# Patient Record
Sex: MALE | Race: WHITE
[De-identification: names, ages, dates, MRNs, and addresses within clinical notes are randomized per-mention and may not be internally consistent; named-entity substitution may affect disease eponyms.]

---

## 2018-04-03 ENCOUNTER — HOSPITAL ENCOUNTER (OUTPATIENT)
Dept: HOSPITAL 95 - MHTC | Age: 83
LOS: 1 days | Discharge: HOME | End: 2018-04-04
Attending: INTERNAL MEDICINE
Payer: MEDICARE

## 2018-04-03 VITALS — WEIGHT: 238.1 LBS | BODY MASS INDEX: 36.09 KG/M2 | HEIGHT: 67.99 IN

## 2018-04-03 DIAGNOSIS — Z99.81: ICD-10-CM

## 2018-04-03 DIAGNOSIS — R06.02: ICD-10-CM

## 2018-04-03 DIAGNOSIS — R94.39: ICD-10-CM

## 2018-04-03 DIAGNOSIS — I25.10: Primary | ICD-10-CM

## 2018-04-03 DIAGNOSIS — Z79.02: ICD-10-CM

## 2018-04-03 DIAGNOSIS — G47.30: ICD-10-CM

## 2018-04-03 PROCEDURE — B218YZZ FLUOROSCOPY OF LEFT INTERNAL MAMMARY BYPASS GRAFT USING OTHER CONTRAST: ICD-10-PCS | Performed by: INTERNAL MEDICINE

## 2018-04-03 PROCEDURE — C1725 CATH, TRANSLUMIN NON-LASER: HCPCS

## 2018-04-03 PROCEDURE — B240ZZ3 ULTRASONOGRAPHY OF SINGLE CORONARY ARTERY, INTRAVASCULAR: ICD-10-PCS | Performed by: INTERNAL MEDICINE

## 2018-04-03 PROCEDURE — 027035Z DILATION OF CORONARY ARTERY, ONE ARTERY WITH TWO DRUG-ELUTING INTRALUMINAL DEVICES, PERCUTANEOUS APPROACH: ICD-10-PCS | Performed by: INTERNAL MEDICINE

## 2018-04-03 PROCEDURE — 4A023N7 MEASUREMENT OF CARDIAC SAMPLING AND PRESSURE, LEFT HEART, PERCUTANEOUS APPROACH: ICD-10-PCS | Performed by: INTERNAL MEDICINE

## 2018-04-03 PROCEDURE — C1769 GUIDE WIRE: HCPCS

## 2018-04-03 PROCEDURE — C1753 CATH, INTRAVAS ULTRASOUND: HCPCS

## 2018-04-03 PROCEDURE — C1760 CLOSURE DEV, VASC: HCPCS

## 2018-04-03 PROCEDURE — C1874 STENT, COATED/COV W/DEL SYS: HCPCS

## 2018-04-03 PROCEDURE — B213YZZ FLUOROSCOPY OF MULTIPLE CORONARY ARTERY BYPASS GRAFTS USING OTHER CONTRAST: ICD-10-PCS | Performed by: INTERNAL MEDICINE

## 2018-04-03 PROCEDURE — 02703ZZ DILATION OF CORONARY ARTERY, ONE ARTERY, PERCUTANEOUS APPROACH: ICD-10-PCS | Performed by: INTERNAL MEDICINE

## 2018-04-03 PROCEDURE — C9600 PERC DRUG-EL COR STENT SING: HCPCS

## 2018-04-03 PROCEDURE — 4A033BC MEASUREMENT OF ARTERIAL PRESSURE, CORONARY, PERCUTANEOUS APPROACH: ICD-10-PCS | Performed by: INTERNAL MEDICINE

## 2018-04-03 PROCEDURE — B211YZZ FLUOROSCOPY OF MULTIPLE CORONARY ARTERIES USING OTHER CONTRAST: ICD-10-PCS | Performed by: INTERNAL MEDICINE

## 2018-04-04 LAB
ANION GAP SERPL CALCULATED.4IONS-SCNC: 6 MMOL/L (ref 6–16)
BASOPHILS # BLD AUTO: 0.05 K/MM3 (ref 0–0.23)
BASOPHILS NFR BLD AUTO: 1 % (ref 0–2)
BUN SERPL-MCNC: 21 MG/DL (ref 8–24)
CALCIUM SERPL-MCNC: 8.3 MG/DL (ref 8.5–10.1)
CHLORIDE SERPL-SCNC: 107 MMOL/L (ref 98–108)
CO2 SERPL-SCNC: 32 MMOL/L (ref 21–32)
CREAT SERPL-MCNC: 1.57 MG/DL (ref 0.6–1.2)
DEPRECATED RDW RBC AUTO: 52.7 FL (ref 35.1–46.3)
EOSINOPHIL # BLD AUTO: 0.17 K/MM3 (ref 0–0.68)
EOSINOPHIL NFR BLD AUTO: 3 % (ref 0–6)
ERYTHROCYTE [DISTWIDTH] IN BLOOD BY AUTOMATED COUNT: 16 % (ref 11.7–14.2)
GLUCOSE SERPL-MCNC: 101 MG/DL (ref 70–99)
HCT VFR BLD AUTO: 34.9 % (ref 37–53)
HGB BLD-MCNC: 9.8 G/DL (ref 13.5–17.5)
IMM GRANULOCYTES # BLD AUTO: 0.01 K/MM3 (ref 0–0.1)
IMM GRANULOCYTES NFR BLD AUTO: 0 % (ref 0–1)
LYMPHOCYTES # BLD AUTO: 1.14 K/MM3 (ref 0.84–5.2)
LYMPHOCYTES NFR BLD AUTO: 18 % (ref 21–46)
MCHC RBC AUTO-ENTMCNC: 28.1 G/DL (ref 31.5–36.5)
MCV RBC AUTO: 89 FL (ref 80–100)
MONOCYTES # BLD AUTO: 0.66 K/MM3 (ref 0.16–1.47)
MONOCYTES NFR BLD AUTO: 10 % (ref 4–13)
NEUTROPHILS # BLD AUTO: 4.36 K/MM3 (ref 1.96–9.15)
NEUTROPHILS NFR BLD AUTO: 68 % (ref 41–73)
NRBC # BLD AUTO: 0 K/MM3 (ref 0–0.02)
NRBC BLD AUTO-RTO: 0 /100 WBC (ref 0–0.2)
PLATELET # BLD AUTO: 161 K/MM3 (ref 150–400)
POTASSIUM SERPL-SCNC: 4.3 MMOL/L (ref 3.5–5.5)
SODIUM SERPL-SCNC: 145 MMOL/L (ref 136–145)

## 2018-04-24 ENCOUNTER — HOSPITAL ENCOUNTER (INPATIENT)
Dept: HOSPITAL 95 - ER | Age: 83
LOS: 5 days | Discharge: HOME | DRG: 205 | End: 2018-04-29
Attending: HOSPITALIST | Admitting: HOSPITALIST
Payer: MEDICARE

## 2018-04-24 VITALS — BODY MASS INDEX: 31.05 KG/M2 | HEIGHT: 72.01 IN | WEIGHT: 229.28 LBS

## 2018-04-24 DIAGNOSIS — M40.209: ICD-10-CM

## 2018-04-24 DIAGNOSIS — G92: ICD-10-CM

## 2018-04-24 DIAGNOSIS — F32.9: ICD-10-CM

## 2018-04-24 DIAGNOSIS — J96.22: ICD-10-CM

## 2018-04-24 DIAGNOSIS — I13.0: ICD-10-CM

## 2018-04-24 DIAGNOSIS — J96.21: ICD-10-CM

## 2018-04-24 DIAGNOSIS — J98.11: ICD-10-CM

## 2018-04-24 DIAGNOSIS — I50.43: ICD-10-CM

## 2018-04-24 DIAGNOSIS — Z95.1: ICD-10-CM

## 2018-04-24 DIAGNOSIS — J44.1: ICD-10-CM

## 2018-04-24 DIAGNOSIS — N18.3: ICD-10-CM

## 2018-04-24 DIAGNOSIS — T50.1X5A: ICD-10-CM

## 2018-04-24 DIAGNOSIS — N17.9: ICD-10-CM

## 2018-04-24 DIAGNOSIS — I25.10: ICD-10-CM

## 2018-04-24 DIAGNOSIS — E87.3: ICD-10-CM

## 2018-04-24 DIAGNOSIS — D63.8: ICD-10-CM

## 2018-04-24 DIAGNOSIS — E66.2: Primary | ICD-10-CM

## 2018-04-24 LAB
ALBUMIN SERPL BCP-MCNC: 3.8 G/DL (ref 3.4–5)
ALBUMIN/GLOB SERPL: 1.1 {RATIO} (ref 0.8–1.8)
ALT SERPL W P-5'-P-CCNC: 24 U/L (ref 12–78)
AST SERPL W P-5'-P-CCNC: 22 U/L (ref 12–37)
BASOPHILS # BLD AUTO: 0.08 K/MM3 (ref 0–0.23)
BASOPHILS NFR BLD AUTO: 1 % (ref 0–2)
BILIRUB SERPL-MCNC: 0.7 MG/DL (ref 0.1–1)
BUN SERPL-MCNC: 26 MG/DL (ref 8–24)
CALCIUM SERPL-MCNC: 8.5 MG/DL (ref 8.5–10.1)
CHLORIDE SERPL-SCNC: 97 MMOL/L (ref 98–108)
CO2 SERPL-SCNC: >45 MMOL/L (ref 21–32)
CREAT SERPL-MCNC: 1.38 MG/DL (ref 0.6–1.2)
DEPRECATED RDW RBC AUTO: 54 FL (ref 35.1–46.3)
EOSINOPHIL # BLD AUTO: 0.13 K/MM3 (ref 0–0.68)
EOSINOPHIL NFR BLD AUTO: 1 % (ref 0–6)
ERYTHROCYTE [DISTWIDTH] IN BLOOD BY AUTOMATED COUNT: 16 % (ref 11.7–14.2)
GLOBULIN SER CALC-MCNC: 3.4 G/DL (ref 2.2–4)
GLUCOSE SERPL-MCNC: 110 MG/DL (ref 70–99)
HCT VFR BLD AUTO: 45.4 % (ref 37–53)
HGB BLD-MCNC: 12.4 G/DL (ref 13.5–17.5)
IMM GRANULOCYTES # BLD AUTO: 0.04 K/MM3 (ref 0–0.1)
IMM GRANULOCYTES NFR BLD AUTO: 0 % (ref 0–1)
LYMPHOCYTES # BLD AUTO: 1.65 K/MM3 (ref 0.84–5.2)
LYMPHOCYTES NFR BLD AUTO: 15 % (ref 21–46)
MAGNESIUM SERPL-MCNC: 3 MG/DL (ref 1.6–2.4)
MCHC RBC AUTO-ENTMCNC: 27.3 G/DL (ref 31.5–36.5)
MCV RBC AUTO: 93 FL (ref 80–100)
MONOCYTES # BLD AUTO: 1.35 K/MM3 (ref 0.16–1.47)
MONOCYTES NFR BLD AUTO: 12 % (ref 4–13)
NEUTROPHILS # BLD AUTO: 8.1 K/MM3 (ref 1.96–9.15)
NEUTROPHILS NFR BLD AUTO: 71 % (ref 41–73)
NRBC # BLD AUTO: 0 K/MM3 (ref 0–0.02)
NRBC BLD AUTO-RTO: 0 /100 WBC (ref 0–0.2)
PH BLDA: 7.22 [PH] (ref 7.35–7.45)
PH BLDA: 7.26 [PH] (ref 7.35–7.45)
PLATELET # BLD AUTO: 196 K/MM3 (ref 150–400)
POTASSIUM SERPL-SCNC: 3.5 MMOL/L (ref 3.5–5.5)
PROT SERPL-MCNC: 7.2 G/DL (ref 6.4–8.2)
PROT UR STRIP-MCNC: (no result) MG/DL
RBC #/AREA URNS HPF: (no result) /HPF (ref 0–2)
SODIUM SERPL-SCNC: 145 MMOL/L (ref 136–145)
SP GR SPEC: 1.02 (ref 1–1.02)
UROBILINOGEN UR STRIP-MCNC: (no result) MG/DL

## 2018-04-24 PROCEDURE — G0515 COGNITIVE SKILLS DEVELOPMENT: HCPCS

## 2018-04-24 PROCEDURE — G8978 MOBILITY CURRENT STATUS: HCPCS

## 2018-04-24 PROCEDURE — G8979 MOBILITY GOAL STATUS: HCPCS

## 2018-04-24 PROCEDURE — G8987 SELF CARE CURRENT STATUS: HCPCS

## 2018-04-24 PROCEDURE — G8988 SELF CARE GOAL STATUS: HCPCS

## 2018-04-25 LAB
ALBUMIN SERPL BCP-MCNC: 3.3 G/DL (ref 3.4–5)
ALBUMIN/GLOB SERPL: 1 {RATIO} (ref 0.8–1.8)
ALT SERPL W P-5'-P-CCNC: 22 U/L (ref 12–78)
ANION GAP SERPL CALCULATED.4IONS-SCNC: 7 MMOL/L (ref 6–16)
AST SERPL W P-5'-P-CCNC: 17 U/L (ref 12–37)
BASOPHILS # BLD AUTO: 0.02 K/MM3 (ref 0–0.23)
BASOPHILS NFR BLD AUTO: 0 % (ref 0–2)
BILIRUB SERPL-MCNC: 0.8 MG/DL (ref 0.1–1)
BUN SERPL-MCNC: 31 MG/DL (ref 8–24)
CALCIUM SERPL-MCNC: 8.7 MG/DL (ref 8.5–10.1)
CHLORIDE SERPL-SCNC: 95 MMOL/L (ref 98–108)
CO2 SERPL-SCNC: 42 MMOL/L (ref 21–32)
CREAT SERPL-MCNC: 1.44 MG/DL (ref 0.6–1.2)
DEPRECATED RDW RBC AUTO: 51.5 FL (ref 35.1–46.3)
EOSINOPHIL # BLD AUTO: 0.02 K/MM3 (ref 0–0.68)
EOSINOPHIL NFR BLD AUTO: 0 % (ref 0–6)
ERYTHROCYTE [DISTWIDTH] IN BLOOD BY AUTOMATED COUNT: 15.9 % (ref 11.7–14.2)
GLOBULIN SER CALC-MCNC: 3.3 G/DL (ref 2.2–4)
GLUCOSE SERPL-MCNC: 112 MG/DL (ref 70–99)
HCT VFR BLD AUTO: 41.9 % (ref 37–53)
HGB BLD-MCNC: 11.9 G/DL (ref 13.5–17.5)
IMM GRANULOCYTES # BLD AUTO: 0.03 K/MM3 (ref 0–0.1)
IMM GRANULOCYTES NFR BLD AUTO: 0 % (ref 0–1)
LYMPHOCYTES # BLD AUTO: 1.07 K/MM3 (ref 0.84–5.2)
LYMPHOCYTES NFR BLD AUTO: 11 % (ref 21–46)
MCHC RBC AUTO-ENTMCNC: 28.4 G/DL (ref 31.5–36.5)
MCV RBC AUTO: 89 FL (ref 80–100)
MONOCYTES # BLD AUTO: 0.25 K/MM3 (ref 0.16–1.47)
MONOCYTES NFR BLD AUTO: 3 % (ref 4–13)
NEUTROPHILS # BLD AUTO: 8.54 K/MM3 (ref 1.96–9.15)
NEUTROPHILS NFR BLD AUTO: 86 % (ref 41–73)
NRBC # BLD AUTO: 0 K/MM3 (ref 0–0.02)
NRBC BLD AUTO-RTO: 0 /100 WBC (ref 0–0.2)
PH BLDA: 7.56 [PH] (ref 7.35–7.45)
PLATELET # BLD AUTO: 133 K/MM3 (ref 150–400)
POTASSIUM SERPL-SCNC: 3.7 MMOL/L (ref 3.5–5.5)
PROT SERPL-MCNC: 6.6 G/DL (ref 6.4–8.2)
SODIUM SERPL-SCNC: 144 MMOL/L (ref 136–145)

## 2018-04-25 PROCEDURE — 5A09357 ASSISTANCE WITH RESPIRATORY VENTILATION, LESS THAN 24 CONSECUTIVE HOURS, CONTINUOUS POSITIVE AIRWAY PRESSURE: ICD-10-PCS | Performed by: HOSPITALIST

## 2018-04-26 LAB
ALBUMIN SERPL BCP-MCNC: 3 G/DL (ref 3.4–5)
ANION GAP SERPL CALCULATED.4IONS-SCNC: 6 MMOL/L (ref 6–16)
BASOPHILS # BLD AUTO: 0.01 K/MM3 (ref 0–0.23)
BASOPHILS NFR BLD AUTO: 0 % (ref 0–2)
BUN SERPL-MCNC: 55 MG/DL (ref 8–24)
CALCIUM SERPL-MCNC: 8.4 MG/DL (ref 8.5–10.1)
CHLORIDE SERPL-SCNC: 95 MMOL/L (ref 98–108)
CO2 SERPL-SCNC: 40 MMOL/L (ref 21–32)
CREAT SERPL-MCNC: 1.82 MG/DL (ref 0.6–1.2)
DEPRECATED RDW RBC AUTO: 51.2 FL (ref 35.1–46.3)
EOSINOPHIL # BLD AUTO: 0 K/MM3 (ref 0–0.68)
EOSINOPHIL NFR BLD AUTO: 0 % (ref 0–6)
ERYTHROCYTE [DISTWIDTH] IN BLOOD BY AUTOMATED COUNT: 16.6 % (ref 11.7–14.2)
GLUCOSE SERPL-MCNC: 133 MG/DL (ref 70–99)
HCT VFR BLD AUTO: 37.6 % (ref 37–53)
HGB BLD-MCNC: 10.7 G/DL (ref 13.5–17.5)
IMM GRANULOCYTES # BLD AUTO: 0.07 K/MM3 (ref 0–0.1)
IMM GRANULOCYTES NFR BLD AUTO: 0 % (ref 0–1)
LYMPHOCYTES # BLD AUTO: 1.12 K/MM3 (ref 0.84–5.2)
LYMPHOCYTES NFR BLD AUTO: 7 % (ref 21–46)
MCHC RBC AUTO-ENTMCNC: 28.5 G/DL (ref 31.5–36.5)
MCV RBC AUTO: 86 FL (ref 80–100)
MONOCYTES # BLD AUTO: 0.42 K/MM3 (ref 0.16–1.47)
MONOCYTES NFR BLD AUTO: 3 % (ref 4–13)
NEUTROPHILS # BLD AUTO: 14.24 K/MM3 (ref 1.96–9.15)
NEUTROPHILS NFR BLD AUTO: 90 % (ref 41–73)
NRBC # BLD AUTO: 0 K/MM3 (ref 0–0.02)
NRBC BLD AUTO-RTO: 0 /100 WBC (ref 0–0.2)
PH BLDA: 7.46 [PH] (ref 7.35–7.45)
PHOSPHATE SERPL-MCNC: 2.7 MG/DL (ref 2.5–4.9)
PLATELET # BLD AUTO: 166 K/MM3 (ref 150–400)
POTASSIUM SERPL-SCNC: 3.5 MMOL/L (ref 3.5–5.5)
SODIUM SERPL-SCNC: 141 MMOL/L (ref 136–145)

## 2018-04-27 LAB
ALBUMIN SERPL BCP-MCNC: 3.5 G/DL (ref 3.4–5)
ANION GAP SERPL CALCULATED.4IONS-SCNC: 4 MMOL/L (ref 6–16)
BASOPHILS # BLD AUTO: 0.01 K/MM3 (ref 0–0.23)
BASOPHILS NFR BLD AUTO: 0 % (ref 0–2)
BUN SERPL-MCNC: 65 MG/DL (ref 8–24)
CALCIUM SERPL-MCNC: 8.8 MG/DL (ref 8.5–10.1)
CHLORIDE SERPL-SCNC: 99 MMOL/L (ref 98–108)
CO2 SERPL-SCNC: 42 MMOL/L (ref 21–32)
CREAT SERPL-MCNC: 1.89 MG/DL (ref 0.6–1.2)
DEPRECATED RDW RBC AUTO: 54.4 FL (ref 35.1–46.3)
EOSINOPHIL # BLD AUTO: 0 K/MM3 (ref 0–0.68)
EOSINOPHIL NFR BLD AUTO: 0 % (ref 0–6)
ERYTHROCYTE [DISTWIDTH] IN BLOOD BY AUTOMATED COUNT: 16.9 % (ref 11.7–14.2)
GLUCOSE SERPL-MCNC: 127 MG/DL (ref 70–99)
HCT VFR BLD AUTO: 42.1 % (ref 37–53)
HGB BLD-MCNC: 12 G/DL (ref 13.5–17.5)
IMM GRANULOCYTES # BLD AUTO: 0.07 K/MM3 (ref 0–0.1)
IMM GRANULOCYTES NFR BLD AUTO: 1 % (ref 0–1)
LYMPHOCYTES # BLD AUTO: 0.91 K/MM3 (ref 0.84–5.2)
LYMPHOCYTES NFR BLD AUTO: 7 % (ref 21–46)
MCHC RBC AUTO-ENTMCNC: 28.5 G/DL (ref 31.5–36.5)
MCV RBC AUTO: 89 FL (ref 80–100)
MONOCYTES # BLD AUTO: 0.66 K/MM3 (ref 0.16–1.47)
MONOCYTES NFR BLD AUTO: 5 % (ref 4–13)
NEUTROPHILS # BLD AUTO: 11.72 K/MM3 (ref 1.96–9.15)
NEUTROPHILS NFR BLD AUTO: 88 % (ref 41–73)
NRBC # BLD AUTO: 0 K/MM3 (ref 0–0.02)
NRBC BLD AUTO-RTO: 0 /100 WBC (ref 0–0.2)
PH BLDA: 7.37 [PH] (ref 7.35–7.45)
PHOSPHATE SERPL-MCNC: 3.6 MG/DL (ref 2.5–4.9)
PLATELET # BLD AUTO: 175 K/MM3 (ref 150–400)
POTASSIUM SERPL-SCNC: 3.6 MMOL/L (ref 3.5–5.5)
SODIUM SERPL-SCNC: 145 MMOL/L (ref 136–145)

## 2018-04-28 LAB
ALBUMIN SERPL BCP-MCNC: 3.4 G/DL (ref 3.4–5)
ANION GAP SERPL CALCULATED.4IONS-SCNC: 4 MMOL/L (ref 6–16)
BASOPHILS # BLD AUTO: 0.01 K/MM3 (ref 0–0.23)
BASOPHILS NFR BLD AUTO: 0 % (ref 0–2)
BUN SERPL-MCNC: 66 MG/DL (ref 8–24)
CALCIUM SERPL-MCNC: 8.7 MG/DL (ref 8.5–10.1)
CHLORIDE SERPL-SCNC: 98 MMOL/L (ref 98–108)
CO2 SERPL-SCNC: 43 MMOL/L (ref 21–32)
CREAT SERPL-MCNC: 1.76 MG/DL (ref 0.6–1.2)
DEPRECATED RDW RBC AUTO: 56.5 FL (ref 35.1–46.3)
EOSINOPHIL # BLD AUTO: 0 K/MM3 (ref 0–0.68)
EOSINOPHIL NFR BLD AUTO: 0 % (ref 0–6)
ERYTHROCYTE [DISTWIDTH] IN BLOOD BY AUTOMATED COUNT: 16.9 % (ref 11.7–14.2)
GLUCOSE SERPL-MCNC: 104 MG/DL (ref 70–99)
HCT VFR BLD AUTO: 44.7 % (ref 37–53)
HGB BLD-MCNC: 12.1 G/DL (ref 13.5–17.5)
IMM GRANULOCYTES # BLD AUTO: 0.08 K/MM3 (ref 0–0.1)
IMM GRANULOCYTES NFR BLD AUTO: 1 % (ref 0–1)
LYMPHOCYTES # BLD AUTO: 0.78 K/MM3 (ref 0.84–5.2)
LYMPHOCYTES NFR BLD AUTO: 7 % (ref 21–46)
MCHC RBC AUTO-ENTMCNC: 27.1 G/DL (ref 31.5–36.5)
MCV RBC AUTO: 92 FL (ref 80–100)
MONOCYTES # BLD AUTO: 1.05 K/MM3 (ref 0.16–1.47)
MONOCYTES NFR BLD AUTO: 9 % (ref 4–13)
NEUTROPHILS # BLD AUTO: 9.43 K/MM3 (ref 1.96–9.15)
NEUTROPHILS NFR BLD AUTO: 83 % (ref 41–73)
NRBC # BLD AUTO: 0 K/MM3 (ref 0–0.02)
NRBC BLD AUTO-RTO: 0 /100 WBC (ref 0–0.2)
PH BLDA: 7.32 [PH] (ref 7.35–7.45)
PHOSPHATE SERPL-MCNC: 3.9 MG/DL (ref 2.5–4.9)
PLATELET # BLD AUTO: 177 K/MM3 (ref 150–400)
POTASSIUM SERPL-SCNC: 3.3 MMOL/L (ref 3.5–5.5)
SODIUM SERPL-SCNC: 145 MMOL/L (ref 136–145)

## 2018-04-29 LAB
ANION GAP SERPL CALCULATED.4IONS-SCNC: 2 MMOL/L (ref 6–16)
BUN SERPL-MCNC: 75 MG/DL (ref 8–24)
CALCIUM SERPL-MCNC: 8.5 MG/DL (ref 8.5–10.1)
CHLORIDE SERPL-SCNC: 97 MMOL/L (ref 98–108)
CO2 SERPL-SCNC: 45 MMOL/L (ref 21–32)
CREAT SERPL-MCNC: 1.95 MG/DL (ref 0.6–1.2)
GLUCOSE SERPL-MCNC: 122 MG/DL (ref 70–99)
PH BLDA: 7.34 [PH] (ref 7.35–7.45)
POTASSIUM SERPL-SCNC: 3.8 MMOL/L (ref 3.5–5.5)
SODIUM SERPL-SCNC: 144 MMOL/L (ref 136–145)

## 2018-05-02 ENCOUNTER — HOSPITAL ENCOUNTER (INPATIENT)
Dept: HOSPITAL 95 - ER | Age: 83
LOS: 3 days | Discharge: NURSING, REHAB OR LONG-TERM CARE | DRG: 189 | End: 2018-05-05
Attending: INTERNAL MEDICINE | Admitting: INTERNAL MEDICINE
Payer: MEDICARE

## 2018-05-02 VITALS — BODY MASS INDEX: 32.25 KG/M2 | HEIGHT: 72.01 IN | WEIGHT: 238.1 LBS

## 2018-05-02 DIAGNOSIS — J44.1: ICD-10-CM

## 2018-05-02 DIAGNOSIS — I25.10: ICD-10-CM

## 2018-05-02 DIAGNOSIS — Z95.1: ICD-10-CM

## 2018-05-02 DIAGNOSIS — F41.9: ICD-10-CM

## 2018-05-02 DIAGNOSIS — J96.22: ICD-10-CM

## 2018-05-02 DIAGNOSIS — E66.9: ICD-10-CM

## 2018-05-02 DIAGNOSIS — F03.90: ICD-10-CM

## 2018-05-02 DIAGNOSIS — J96.21: Primary | ICD-10-CM

## 2018-05-02 DIAGNOSIS — N18.3: ICD-10-CM

## 2018-05-02 DIAGNOSIS — Z79.899: ICD-10-CM

## 2018-05-02 DIAGNOSIS — G47.33: ICD-10-CM

## 2018-05-02 DIAGNOSIS — Z85.46: ICD-10-CM

## 2018-05-02 DIAGNOSIS — N17.9: ICD-10-CM

## 2018-05-02 DIAGNOSIS — Z95.5: ICD-10-CM

## 2018-05-02 DIAGNOSIS — G93.41: ICD-10-CM

## 2018-05-02 DIAGNOSIS — F32.9: ICD-10-CM

## 2018-05-02 DIAGNOSIS — I13.0: ICD-10-CM

## 2018-05-02 DIAGNOSIS — R53.1: ICD-10-CM

## 2018-05-02 DIAGNOSIS — Z99.81: ICD-10-CM

## 2018-05-02 DIAGNOSIS — J98.11: ICD-10-CM

## 2018-05-02 DIAGNOSIS — Z79.82: ICD-10-CM

## 2018-05-02 DIAGNOSIS — Z79.52: ICD-10-CM

## 2018-05-02 DIAGNOSIS — I50.42: ICD-10-CM

## 2018-05-02 DIAGNOSIS — Z79.02: ICD-10-CM

## 2018-05-02 DIAGNOSIS — Z87.891: ICD-10-CM

## 2018-05-02 DIAGNOSIS — I87.2: ICD-10-CM

## 2018-05-02 LAB
ALBUMIN SERPL BCP-MCNC: 3.2 G/DL (ref 3.4–5)
ALBUMIN/GLOB SERPL: 1 {RATIO} (ref 0.8–1.8)
ALT SERPL W P-5'-P-CCNC: 36 U/L (ref 12–78)
AST SERPL W P-5'-P-CCNC: 31 U/L (ref 12–37)
BASOPHILS # BLD AUTO: 0.02 K/MM3 (ref 0–0.23)
BASOPHILS NFR BLD AUTO: 0 % (ref 0–2)
BILIRUB SERPL-MCNC: 0.5 MG/DL (ref 0.1–1)
BUN SERPL-MCNC: 48 MG/DL (ref 8–24)
CALCIUM SERPL-MCNC: 8.6 MG/DL (ref 8.5–10.1)
CHLORIDE SERPL-SCNC: 99 MMOL/L (ref 98–108)
CO2 SERPL-SCNC: >45 MMOL/L (ref 21–32)
CREAT SERPL-MCNC: 1.48 MG/DL (ref 0.6–1.2)
DEPRECATED RDW RBC AUTO: 57 FL (ref 35.1–46.3)
EOSINOPHIL # BLD AUTO: 0.29 K/MM3 (ref 0–0.68)
EOSINOPHIL NFR BLD AUTO: 2 % (ref 0–6)
ERYTHROCYTE [DISTWIDTH] IN BLOOD BY AUTOMATED COUNT: 17 % (ref 11.7–14.2)
GLOBULIN SER CALC-MCNC: 3.1 G/DL (ref 2.2–4)
GLUCOSE SERPL-MCNC: 155 MG/DL (ref 70–99)
HCT VFR BLD AUTO: 44.3 % (ref 37–53)
HGB BLD-MCNC: 12 G/DL (ref 13.5–17.5)
IMM GRANULOCYTES # BLD AUTO: 0.11 K/MM3 (ref 0–0.1)
IMM GRANULOCYTES NFR BLD AUTO: 1 % (ref 0–1)
LYMPHOCYTES # BLD AUTO: 1.17 K/MM3 (ref 0.84–5.2)
LYMPHOCYTES NFR BLD AUTO: 9 % (ref 21–46)
MAGNESIUM SERPL-MCNC: 2.6 MG/DL (ref 1.6–2.4)
MCHC RBC AUTO-ENTMCNC: 27.1 G/DL (ref 31.5–36.5)
MCV RBC AUTO: 92 FL (ref 80–100)
MONOCYTES # BLD AUTO: 1.57 K/MM3 (ref 0.16–1.47)
MONOCYTES NFR BLD AUTO: 11 % (ref 4–13)
NEUTROPHILS # BLD AUTO: 10.62 K/MM3 (ref 1.96–9.15)
NEUTROPHILS NFR BLD AUTO: 77 % (ref 41–73)
NRBC # BLD AUTO: 0 K/MM3 (ref 0–0.02)
NRBC BLD AUTO-RTO: 0 /100 WBC (ref 0–0.2)
PH BLDA: 7.25 [PH] (ref 7.35–7.45)
PH BLDA: 7.27 [PH] (ref 7.35–7.45)
PLATELET # BLD AUTO: 153 K/MM3 (ref 150–400)
POTASSIUM SERPL-SCNC: 4.4 MMOL/L (ref 3.5–5.5)
PROT SERPL-MCNC: 6.3 G/DL (ref 6.4–8.2)
RBC #/AREA URNS HPF: (no result) /HPF (ref 0–2)
SODIUM SERPL-SCNC: 145 MMOL/L (ref 136–145)
SP GR SPEC: 1.02 (ref 1–1.02)
TROPONIN I SERPL-MCNC: <0.015 NG/ML (ref 0–0.04)
UROBILINOGEN UR STRIP-MCNC: (no result) MG/DL
WBC #/AREA URNS HPF: (no result) /HPF (ref 0–5)

## 2018-05-02 PROCEDURE — C9113 INJ PANTOPRAZOLE SODIUM, VIA: HCPCS

## 2018-05-02 PROCEDURE — G8979 MOBILITY GOAL STATUS: HCPCS

## 2018-05-02 PROCEDURE — G8978 MOBILITY CURRENT STATUS: HCPCS

## 2018-05-02 PROCEDURE — 5A09357 ASSISTANCE WITH RESPIRATORY VENTILATION, LESS THAN 24 CONSECUTIVE HOURS, CONTINUOUS POSITIVE AIRWAY PRESSURE: ICD-10-PCS | Performed by: INTERNAL MEDICINE

## 2018-05-03 LAB
ALBUMIN SERPL BCP-MCNC: 2.9 G/DL (ref 3.4–5)
ALBUMIN/GLOB SERPL: 1 {RATIO} (ref 0.8–1.8)
ALT SERPL W P-5'-P-CCNC: 33 U/L (ref 12–78)
ANION GAP SERPL CALCULATED.4IONS-SCNC: 5 MMOL/L (ref 6–16)
AST SERPL W P-5'-P-CCNC: 17 U/L (ref 12–37)
BASOPHILS # BLD AUTO: 0.01 K/MM3 (ref 0–0.23)
BASOPHILS NFR BLD AUTO: 0 % (ref 0–2)
BILIRUB SERPL-MCNC: 0.7 MG/DL (ref 0.1–1)
BUN SERPL-MCNC: 45 MG/DL (ref 8–24)
CALCIUM SERPL-MCNC: 8.5 MG/DL (ref 8.5–10.1)
CHLORIDE SERPL-SCNC: 101 MMOL/L (ref 98–108)
CO2 SERPL-SCNC: 40 MMOL/L (ref 21–32)
CREAT SERPL-MCNC: 1.17 MG/DL (ref 0.6–1.2)
DEPRECATED RDW RBC AUTO: 55.3 FL (ref 35.1–46.3)
EOSINOPHIL # BLD AUTO: 0 K/MM3 (ref 0–0.68)
EOSINOPHIL NFR BLD AUTO: 0 % (ref 0–6)
ERYTHROCYTE [DISTWIDTH] IN BLOOD BY AUTOMATED COUNT: 16.8 % (ref 11.7–14.2)
GLOBULIN SER CALC-MCNC: 2.8 G/DL (ref 2.2–4)
GLUCOSE SERPL-MCNC: 142 MG/DL (ref 70–99)
HCT VFR BLD AUTO: 41.1 % (ref 37–53)
HGB BLD-MCNC: 11.5 G/DL (ref 13.5–17.5)
IMM GRANULOCYTES # BLD AUTO: 0.11 K/MM3 (ref 0–0.1)
IMM GRANULOCYTES NFR BLD AUTO: 1 % (ref 0–1)
LYMPHOCYTES # BLD AUTO: 0.68 K/MM3 (ref 0.84–5.2)
LYMPHOCYTES NFR BLD AUTO: 6 % (ref 21–46)
MCHC RBC AUTO-ENTMCNC: 28 G/DL (ref 31.5–36.5)
MCV RBC AUTO: 90 FL (ref 80–100)
MONOCYTES # BLD AUTO: 0.05 K/MM3 (ref 0.16–1.47)
MONOCYTES NFR BLD AUTO: 0 % (ref 4–13)
NEUTROPHILS # BLD AUTO: 10.78 K/MM3 (ref 1.96–9.15)
NEUTROPHILS NFR BLD AUTO: 93 % (ref 41–73)
NRBC # BLD AUTO: 0 K/MM3 (ref 0–0.02)
NRBC BLD AUTO-RTO: 0 /100 WBC (ref 0–0.2)
PH BLDA: 7.45 [PH] (ref 7.35–7.45)
PLATELET # BLD AUTO: 150 K/MM3 (ref 150–400)
POTASSIUM SERPL-SCNC: 4.2 MMOL/L (ref 3.5–5.5)
PROT SERPL-MCNC: 5.7 G/DL (ref 6.4–8.2)
SODIUM SERPL-SCNC: 146 MMOL/L (ref 136–145)

## 2018-05-04 LAB
ALBUMIN SERPL BCP-MCNC: 2.8 G/DL (ref 3.4–5)
ALBUMIN/GLOB SERPL: 1 {RATIO} (ref 0.8–1.8)
ALT SERPL W P-5'-P-CCNC: 29 U/L (ref 12–78)
ANION GAP SERPL CALCULATED.4IONS-SCNC: 3 MMOL/L (ref 6–16)
AST SERPL W P-5'-P-CCNC: 16 U/L (ref 12–37)
BASOPHILS # BLD AUTO: 0.02 K/MM3 (ref 0–0.23)
BASOPHILS NFR BLD AUTO: 0 % (ref 0–2)
BILIRUB SERPL-MCNC: 0.7 MG/DL (ref 0.1–1)
BUN SERPL-MCNC: 58 MG/DL (ref 8–24)
CALCIUM SERPL-MCNC: 8.5 MG/DL (ref 8.5–10.1)
CHLORIDE SERPL-SCNC: 100 MMOL/L (ref 98–108)
CO2 SERPL-SCNC: 39 MMOL/L (ref 21–32)
CREAT SERPL-MCNC: 1.75 MG/DL (ref 0.6–1.2)
DEPRECATED RDW RBC AUTO: 54 FL (ref 35.1–46.3)
EOSINOPHIL # BLD AUTO: 0 K/MM3 (ref 0–0.68)
EOSINOPHIL NFR BLD AUTO: 0 % (ref 0–6)
ERYTHROCYTE [DISTWIDTH] IN BLOOD BY AUTOMATED COUNT: 17.6 % (ref 11.7–14.2)
GLOBULIN SER CALC-MCNC: 2.9 G/DL (ref 2.2–4)
GLUCOSE SERPL-MCNC: 112 MG/DL (ref 70–99)
HCT VFR BLD AUTO: 38.2 % (ref 37–53)
HGB BLD-MCNC: 11.3 G/DL (ref 13.5–17.5)
IMM GRANULOCYTES # BLD AUTO: 0.2 K/MM3 (ref 0–0.1)
IMM GRANULOCYTES NFR BLD AUTO: 2 % (ref 0–1)
LYMPHOCYTES # BLD AUTO: 1.22 K/MM3 (ref 0.84–5.2)
LYMPHOCYTES NFR BLD AUTO: 9 % (ref 21–46)
MCHC RBC AUTO-ENTMCNC: 29.6 G/DL (ref 31.5–36.5)
MCV RBC AUTO: 86 FL (ref 80–100)
MONOCYTES # BLD AUTO: 1.15 K/MM3 (ref 0.16–1.47)
MONOCYTES NFR BLD AUTO: 8 % (ref 4–13)
NEUTROPHILS # BLD AUTO: 11.13 K/MM3 (ref 1.96–9.15)
NEUTROPHILS NFR BLD AUTO: 81 % (ref 41–73)
NRBC # BLD AUTO: 0 K/MM3 (ref 0–0.02)
NRBC BLD AUTO-RTO: 0 /100 WBC (ref 0–0.2)
PH BLDA: 7.52 [PH] (ref 7.35–7.45)
PLATELET # BLD AUTO: 183 K/MM3 (ref 150–400)
POTASSIUM SERPL-SCNC: 3.7 MMOL/L (ref 3.5–5.5)
PROT SERPL-MCNC: 5.7 G/DL (ref 6.4–8.2)
SODIUM SERPL-SCNC: 142 MMOL/L (ref 136–145)

## 2018-05-05 LAB
ALBUMIN SERPL BCP-MCNC: 2.8 G/DL (ref 3.4–5)
ALBUMIN/GLOB SERPL: 0.9 {RATIO} (ref 0.8–1.8)
ALT SERPL W P-5'-P-CCNC: 30 U/L (ref 12–78)
ANION GAP SERPL CALCULATED.4IONS-SCNC: 2 MMOL/L (ref 6–16)
AST SERPL W P-5'-P-CCNC: 15 U/L (ref 12–37)
BILIRUB SERPL-MCNC: 0.5 MG/DL (ref 0.1–1)
BUN SERPL-MCNC: 62 MG/DL (ref 8–24)
CALCIUM SERPL-MCNC: 8.7 MG/DL (ref 8.5–10.1)
CHLORIDE SERPL-SCNC: 103 MMOL/L (ref 98–108)
CO2 SERPL-SCNC: 39 MMOL/L (ref 21–32)
CREAT SERPL-MCNC: 2.04 MG/DL (ref 0.6–1.2)
GLOBULIN SER CALC-MCNC: 3 G/DL (ref 2.2–4)
GLUCOSE SERPL-MCNC: 97 MG/DL (ref 70–99)
PH BLDA: 7.32 [PH] (ref 7.35–7.45)
PH BLDA: 7.34 [PH] (ref 7.35–7.45)
POTASSIUM SERPL-SCNC: 3.8 MMOL/L (ref 3.5–5.5)
PROT SERPL-MCNC: 5.8 G/DL (ref 6.4–8.2)
SODIUM SERPL-SCNC: 144 MMOL/L (ref 136–145)

## 2018-06-11 ENCOUNTER — HOSPITAL ENCOUNTER (OUTPATIENT)
Dept: HOSPITAL 95 - LAB | Age: 83
Discharge: HOME | End: 2018-06-11
Attending: FAMILY MEDICINE
Payer: MEDICARE

## 2018-06-11 DIAGNOSIS — N18.3: Primary | ICD-10-CM

## 2018-06-11 LAB
ALBUMIN SERPL BCP-MCNC: 3.7 G/DL (ref 3.4–5)
ANION GAP SERPL CALCULATED.4IONS-SCNC: 9 MMOL/L (ref 6–16)
BUN SERPL-MCNC: 70 MG/DL (ref 8–24)
CALCIUM SERPL-MCNC: 9.3 MG/DL (ref 8.5–10.1)
CHLORIDE SERPL-SCNC: 99 MMOL/L (ref 98–108)
CO2 SERPL-SCNC: 31 MMOL/L (ref 21–32)
CREAT SERPL-MCNC: 2.47 MG/DL (ref 0.6–1.2)
GLUCOSE SERPL-MCNC: 116 MG/DL (ref 70–99)
PHOSPHATE SERPL-MCNC: 3.5 MG/DL (ref 2.5–4.9)
POTASSIUM SERPL-SCNC: 3.9 MMOL/L (ref 3.5–5.5)
SODIUM SERPL-SCNC: 139 MMOL/L (ref 136–145)

## 2020-07-08 ENCOUNTER — HOSPITAL ENCOUNTER (OUTPATIENT)
Dept: HOSPITAL 95 - PLD | Age: 85
Discharge: HOME | End: 2020-07-08
Attending: DERMATOLOGY
Payer: MEDICARE

## 2020-07-08 DIAGNOSIS — D22.4: Primary | ICD-10-CM

## 2022-05-21 ENCOUNTER — HOSPITAL ENCOUNTER (EMERGENCY)
Dept: HOSPITAL 95 - ER | Age: 87
LOS: 1 days | Discharge: HOME | End: 2022-05-22
Payer: MEDICARE

## 2022-05-21 VITALS — WEIGHT: 259.99 LBS | BODY MASS INDEX: 35.21 KG/M2 | HEIGHT: 72 IN

## 2022-05-21 DIAGNOSIS — K21.9: ICD-10-CM

## 2022-05-21 DIAGNOSIS — M54.2: ICD-10-CM

## 2022-05-21 DIAGNOSIS — Z95.1: ICD-10-CM

## 2022-05-21 DIAGNOSIS — Z79.82: ICD-10-CM

## 2022-05-21 DIAGNOSIS — R07.89: ICD-10-CM

## 2022-05-21 DIAGNOSIS — Z99.81: ICD-10-CM

## 2022-05-21 DIAGNOSIS — I25.10: ICD-10-CM

## 2022-05-21 DIAGNOSIS — Z95.5: ICD-10-CM

## 2022-05-21 DIAGNOSIS — Z79.899: ICD-10-CM

## 2022-05-21 DIAGNOSIS — I50.42: ICD-10-CM

## 2022-05-21 DIAGNOSIS — M25.512: Primary | ICD-10-CM

## 2022-05-21 DIAGNOSIS — I11.0: ICD-10-CM

## 2022-05-21 DIAGNOSIS — J44.9: ICD-10-CM

## 2022-05-21 DIAGNOSIS — I25.2: ICD-10-CM

## 2022-05-21 LAB
ALBUMIN SERPL BCP-MCNC: 3.6 G/DL (ref 3.4–5)
ALBUMIN/GLOB SERPL: 1.1 {RATIO} (ref 0.8–1.8)
ALT SERPL W P-5'-P-CCNC: 24 U/L (ref 12–78)
ANION GAP SERPL CALCULATED.4IONS-SCNC: 5 MMOL/L (ref 6–16)
AST SERPL W P-5'-P-CCNC: 16 U/L (ref 12–37)
BASOPHILS # BLD AUTO: 0.08 K/MM3 (ref 0–0.23)
BASOPHILS NFR BLD AUTO: 1 % (ref 0–2)
BILIRUB SERPL-MCNC: 0.4 MG/DL (ref 0.1–1)
BUN SERPL-MCNC: 26 MG/DL (ref 8–24)
CALCIUM SERPL-MCNC: 8.9 MG/DL (ref 8.5–10.1)
CHLORIDE SERPL-SCNC: 107 MMOL/L (ref 98–108)
CO2 SERPL-SCNC: 33 MMOL/L (ref 21–32)
CREAT SERPL-MCNC: 2.12 MG/DL (ref 0.6–1.2)
DEPRECATED RDW RBC AUTO: 52.2 FL (ref 35.1–46.3)
EOSINOPHIL # BLD AUTO: 0.26 K/MM3 (ref 0–0.68)
EOSINOPHIL NFR BLD AUTO: 4 % (ref 0–6)
ERYTHROCYTE [DISTWIDTH] IN BLOOD BY AUTOMATED COUNT: 16.5 % (ref 11.7–14.2)
GLOBULIN SER CALC-MCNC: 3.3 G/DL (ref 2.2–4)
GLUCOSE SERPL-MCNC: 94 MG/DL (ref 70–99)
HCT VFR BLD AUTO: 41 % (ref 37–53)
HGB BLD-MCNC: 11.9 G/DL (ref 13.5–17.5)
IMM GRANULOCYTES # BLD AUTO: 0.02 K/MM3 (ref 0–0.1)
IMM GRANULOCYTES NFR BLD AUTO: 0 % (ref 0–1)
LYMPHOCYTES # BLD AUTO: 1.35 K/MM3 (ref 0.84–5.2)
LYMPHOCYTES NFR BLD AUTO: 20 % (ref 21–46)
MCHC RBC AUTO-ENTMCNC: 29 G/DL (ref 31.5–36.5)
MCV RBC AUTO: 87 FL (ref 80–100)
MONOCYTES # BLD AUTO: 0.74 K/MM3 (ref 0.16–1.47)
MONOCYTES NFR BLD AUTO: 11 % (ref 4–13)
NEUTROPHILS # BLD AUTO: 4.24 K/MM3 (ref 1.96–9.15)
NEUTROPHILS NFR BLD AUTO: 63 % (ref 41–73)
NRBC # BLD AUTO: 0 K/MM3 (ref 0–0.02)
NRBC BLD AUTO-RTO: 0 /100 WBC (ref 0–0.2)
PLATELET # BLD AUTO: 188 K/MM3 (ref 150–400)
POTASSIUM SERPL-SCNC: 4.1 MMOL/L (ref 3.5–5.5)
PROT SERPL-MCNC: 6.9 G/DL (ref 6.4–8.2)
SODIUM SERPL-SCNC: 145 MMOL/L (ref 136–145)

## 2022-05-21 PROCEDURE — A9270 NON-COVERED ITEM OR SERVICE: HCPCS

## 2023-03-03 ENCOUNTER — HOSPITAL ENCOUNTER (OUTPATIENT)
Dept: HOSPITAL 95 - MEDS | Age: 88
Setting detail: OBSERVATION
LOS: 1 days | Discharge: HOME | End: 2023-03-04
Attending: INTERNAL MEDICINE | Admitting: INTERNAL MEDICINE
Payer: MEDICARE

## 2023-03-03 VITALS — DIASTOLIC BLOOD PRESSURE: 84 MMHG | SYSTOLIC BLOOD PRESSURE: 142 MMHG

## 2023-03-03 VITALS — BODY MASS INDEX: 32.76 KG/M2 | HEIGHT: 72 IN | WEIGHT: 241.85 LBS

## 2023-03-03 DIAGNOSIS — F32.A: ICD-10-CM

## 2023-03-03 DIAGNOSIS — E78.5: ICD-10-CM

## 2023-03-03 DIAGNOSIS — I50.32: ICD-10-CM

## 2023-03-03 DIAGNOSIS — Z99.81: ICD-10-CM

## 2023-03-03 DIAGNOSIS — E66.01: ICD-10-CM

## 2023-03-03 DIAGNOSIS — J96.10: ICD-10-CM

## 2023-03-03 DIAGNOSIS — F41.9: ICD-10-CM

## 2023-03-03 DIAGNOSIS — I13.0: ICD-10-CM

## 2023-03-03 DIAGNOSIS — Z74.09: ICD-10-CM

## 2023-03-03 DIAGNOSIS — N18.32: ICD-10-CM

## 2023-03-03 DIAGNOSIS — Z79.82: ICD-10-CM

## 2023-03-03 DIAGNOSIS — G47.33: ICD-10-CM

## 2023-03-03 DIAGNOSIS — I25.10: ICD-10-CM

## 2023-03-03 DIAGNOSIS — E55.9: ICD-10-CM

## 2023-03-03 DIAGNOSIS — K21.9: ICD-10-CM

## 2023-03-03 DIAGNOSIS — K59.00: Primary | ICD-10-CM

## 2023-03-03 DIAGNOSIS — Z79.899: ICD-10-CM

## 2023-03-03 DIAGNOSIS — D63.1: ICD-10-CM

## 2023-03-03 PROCEDURE — G0378 HOSPITAL OBSERVATION PER HR: HCPCS

## 2023-03-03 PROCEDURE — A9270 NON-COVERED ITEM OR SERVICE: HCPCS

## 2023-03-03 NOTE — NUR
ADMISSION NOTE:
PATIENT IS A DIRECT ADMITT FROM [facility]. ARRIVED TO ROOM AT 1810 VIA
WHEELCHAIR. PATIENT ADMITTED c DX OF CONSTIPATION. ADMISSION SCREENING DONE.
ADMISSION AND SKIN ASSESSMENT c 2 RN VERIFIER COMPLETED. NOTED PATIENT HAS
BRUISING TO BUE'S SCATTERED T/O. DARK SKIN DISCOLORATION TO BLE'S. NO IV
ACCESS. PATIENT A&OX4. CALM, PLEASANT AND COOPERATIVE c CARE. PATIENT ON O2,
3L VIA NC c SPO2 OF 94%. DENIES CP/PRESSURE. LUNGS CLEAR/DIM T/O TO
AUSCULTATION. DENIES SOB, N/V. PATIENT REPORT HAD SMALL BROWN BM 3 HRS PRIOR
TO ADMISSION. VITAL SIGNS REVIEWED. PATIENT SITTING UP ON THE EOB AT THIS
TIME. CALLED DR. SCHULTZ AT AROUND 1815 BUT GOES TO VOICE MAIL. THIS RN
LEFT A MESSAGE TO CALL BACK REGARDING HIS DIRECT ADMITT ARRIVE TO ROOM. CALL
LIGHT IN REACH.

## 2023-03-04 VITALS — DIASTOLIC BLOOD PRESSURE: 83 MMHG | SYSTOLIC BLOOD PRESSURE: 168 MMHG

## 2023-03-04 VITALS — SYSTOLIC BLOOD PRESSURE: 147 MMHG | DIASTOLIC BLOOD PRESSURE: 76 MMHG

## 2023-03-04 VITALS — DIASTOLIC BLOOD PRESSURE: 94 MMHG | SYSTOLIC BLOOD PRESSURE: 167 MMHG

## 2023-03-04 LAB
ALBUMIN SERPL BCP-MCNC: 3.3 G/DL (ref 3.4–5)
ALBUMIN/GLOB SERPL: 0.9 {RATIO} (ref 0.8–1.8)
ALT SERPL W P-5'-P-CCNC: 17 U/L (ref 12–78)
ANION GAP SERPL CALCULATED.4IONS-SCNC: 4 MMOL/L (ref 6–16)
AST SERPL W P-5'-P-CCNC: 19 U/L (ref 12–37)
BASOPHILS # BLD AUTO: 0.09 K/MM3 (ref 0–0.23)
BASOPHILS NFR BLD AUTO: 1 % (ref 0–2)
BILIRUB SERPL-MCNC: 0.7 MG/DL (ref 0.1–1)
BUN SERPL-MCNC: 20 MG/DL (ref 8–24)
CALCIUM SERPL-MCNC: 9 MG/DL (ref 8.5–10.1)
CHLORIDE SERPL-SCNC: 107 MMOL/L (ref 98–108)
CO2 SERPL-SCNC: 32 MMOL/L (ref 21–32)
CREAT SERPL-MCNC: 1.94 MG/DL (ref 0.6–1.2)
DEPRECATED RDW RBC AUTO: 52.4 FL (ref 35.1–46.3)
EOSINOPHIL # BLD AUTO: 0.26 K/MM3 (ref 0–0.68)
EOSINOPHIL NFR BLD AUTO: 4 % (ref 0–6)
ERYTHROCYTE [DISTWIDTH] IN BLOOD BY AUTOMATED COUNT: 16.3 % (ref 11.7–14.2)
GLOBULIN SER CALC-MCNC: 3.5 G/DL (ref 2.2–4)
GLUCOSE SERPL-MCNC: 83 MG/DL (ref 70–99)
HCT VFR BLD AUTO: 41.1 % (ref 37–53)
HGB BLD-MCNC: 12.2 G/DL (ref 13.5–17.5)
IMM GRANULOCYTES # BLD AUTO: 0.02 K/MM3 (ref 0–0.1)
IMM GRANULOCYTES NFR BLD AUTO: 0 % (ref 0–1)
LYMPHOCYTES # BLD AUTO: 1.34 K/MM3 (ref 0.84–5.2)
LYMPHOCYTES NFR BLD AUTO: 21 % (ref 21–46)
MAGNESIUM SERPL-MCNC: 2.5 MG/DL (ref 1.6–2.4)
MCHC RBC AUTO-ENTMCNC: 29.7 G/DL (ref 31.5–36.5)
MCV RBC AUTO: 87 FL (ref 80–100)
MONOCYTES # BLD AUTO: 0.74 K/MM3 (ref 0.16–1.47)
MONOCYTES NFR BLD AUTO: 11 % (ref 4–13)
NEUTROPHILS # BLD AUTO: 4.02 K/MM3 (ref 1.96–9.15)
NEUTROPHILS NFR BLD AUTO: 62 % (ref 41–73)
NRBC # BLD AUTO: 0 K/MM3 (ref 0–0.02)
NRBC BLD AUTO-RTO: 0 /100 WBC (ref 0–0.2)
PLATELET # BLD AUTO: 195 K/MM3 (ref 150–400)
POTASSIUM SERPL-SCNC: 4.3 MMOL/L (ref 3.5–5.5)
PROT SERPL-MCNC: 6.8 G/DL (ref 6.4–8.2)
SODIUM SERPL-SCNC: 143 MMOL/L (ref 136–145)

## 2023-03-04 NOTE — NUR
PT DOWN TO CT, PT UNABLE TO GET CT DUE TO NOT BEING ABLE TO TOLERATE LAYING
FLAT FOR THE CT. PT STATES HE BECOMES SOB WHILE LAYING FLAT, EVEN WHILE HAVING
3L NC ON. NOTIFIED  OF PTS INABILITY TO TOLERATE CT SCAN. PER
 WE WILL R[place] IF PT NEEDS ABDOMINAL CT IN MORNING GIVEN THE
ORIGINAL ORDER FOR THE CT WAS FOR THE PTS CONSTIPATION AND AFTER THE PT
RECIEVED HIS FLEET ENEMA EARLIER THIS EVENING THE PT WAS ABLE TO HAVE A MEDIUM
SIZED BOWEL MOVEMENT.

## 2023-03-04 NOTE — NUR
SHIFT SUMMARY;
 
NO ACUTE CHANGES T/O THE NIGHT. THE PT RECIEVED A FLEET ENEMA AT THE
BEGINNING OF THE SHIFT, 1 MEDIUM SIZED FIRM BM SINCE THEN. THE PT HAS BEEN
RESTING IN BED FOR THE ENTIRETY OF THE NIGHT. THE PT HAS BEEN ROTATING BETWEEN
3L NC AND 3L BLEED INTO A CPAP. THE PTS O2 SATS HAVE BEEN >95% ON BOTH THE NC
AND THE CPAP. THE PT DENIES ANY PAIN OTHER THAN SOME PAIN IN HIS LOWER ABDOMEN
R/T CONSTIPATION. THE PT DENIES ANY CHEST PAIN/PRESSURE T/O THE NIGHT.
CURRENTLY THE PT IS RESTING IN BED WITH THE BED IN THE LOWEST POSITION AND THE
CALL LIGHT AT BEDSIDE.

## 2024-12-01 ENCOUNTER — HOSPITAL ENCOUNTER (OUTPATIENT)
Dept: HOSPITAL 95 - ER | Age: 89
Setting detail: OBSERVATION
LOS: 2 days | Discharge: HOME HEALTH | End: 2024-12-03
Attending: INTERNAL MEDICINE | Admitting: INTERNAL MEDICINE
Payer: MEDICARE

## 2024-12-01 VITALS — SYSTOLIC BLOOD PRESSURE: 113 MMHG | DIASTOLIC BLOOD PRESSURE: 96 MMHG

## 2024-12-01 VITALS — HEIGHT: 72 IN | BODY MASS INDEX: 32.51 KG/M2 | WEIGHT: 240 LBS

## 2024-12-01 VITALS — SYSTOLIC BLOOD PRESSURE: 119 MMHG | DIASTOLIC BLOOD PRESSURE: 58 MMHG

## 2024-12-01 VITALS — SYSTOLIC BLOOD PRESSURE: 122 MMHG | DIASTOLIC BLOOD PRESSURE: 63 MMHG

## 2024-12-01 DIAGNOSIS — F32.A: ICD-10-CM

## 2024-12-01 DIAGNOSIS — J44.1: ICD-10-CM

## 2024-12-01 DIAGNOSIS — K59.09: ICD-10-CM

## 2024-12-01 DIAGNOSIS — I50.32: ICD-10-CM

## 2024-12-01 DIAGNOSIS — Z79.82: ICD-10-CM

## 2024-12-01 DIAGNOSIS — E78.5: ICD-10-CM

## 2024-12-01 DIAGNOSIS — G47.33: ICD-10-CM

## 2024-12-01 DIAGNOSIS — J96.21: ICD-10-CM

## 2024-12-01 DIAGNOSIS — Z95.5: ICD-10-CM

## 2024-12-01 DIAGNOSIS — J96.22: Primary | ICD-10-CM

## 2024-12-01 DIAGNOSIS — N18.32: ICD-10-CM

## 2024-12-01 DIAGNOSIS — I13.0: ICD-10-CM

## 2024-12-01 DIAGNOSIS — Z87.891: ICD-10-CM

## 2024-12-01 DIAGNOSIS — Z79.899: ICD-10-CM

## 2024-12-01 DIAGNOSIS — I25.10: ICD-10-CM

## 2024-12-01 LAB
ALBUMIN SERPL BCP-MCNC: 3.2 G/DL (ref 3.4–5)
ALBUMIN/GLOB SERPL: 1 {RATIO} (ref 0.8–1.8)
ALT SERPL W P-5'-P-CCNC: 18 U/L (ref 12–78)
ANION GAP SERPL CALCULATED.4IONS-SCNC: 8 MMOL/L (ref 3–11)
AST SERPL W P-5'-P-CCNC: 25 U/L (ref 12–37)
BASOPHILS # BLD AUTO: 0.04 K/MM3 (ref 0–0.23)
BASOPHILS NFR BLD AUTO: 0 % (ref 0–2)
BILIRUB SERPL-MCNC: 0.6 MG/DL (ref 0.1–1)
BUN SERPL-MCNC: 26 MG/DL (ref 8–24)
CALCIUM SERPL-MCNC: 8.9 MG/DL (ref 8.5–10.1)
CHLORIDE SERPL-SCNC: 111 MMOL/L (ref 98–108)
CO2 SERPL-SCNC: 30 MMOL/L (ref 21–32)
CREAT SERPL-MCNC: 1.92 MG/DL (ref 0.6–1.2)
DEPRECATED RDW RBC AUTO: 54.1 FL (ref 35.1–46.3)
EOSINOPHIL # BLD AUTO: 0.07 K/MM3 (ref 0–0.68)
EOSINOPHIL NFR BLD AUTO: 1 % (ref 0–6)
ERYTHROCYTE [DISTWIDTH] IN BLOOD BY AUTOMATED COUNT: 16.1 % (ref 11.7–14.2)
FLUAV RNA SPEC QL NAA+PROBE: NEGATIVE
FLUBV RNA SPEC QL NAA+PROBE: NEGATIVE
GLOBULIN SER CALC-MCNC: 3.2 G/DL (ref 2.2–4)
GLUCOSE SERPL-MCNC: 100 MG/DL (ref 70–99)
HCT VFR BLD AUTO: 42.6 % (ref 37–53)
HGB BLD-MCNC: 12.6 G/DL (ref 13.5–17.5)
IMM GRANULOCYTES # BLD AUTO: 0.01 K/MM3 (ref 0–0.1)
IMM GRANULOCYTES NFR BLD AUTO: 0 % (ref 0–1)
LYMPHOCYTES # BLD AUTO: 0.46 K/MM3 (ref 0.84–5.2)
LYMPHOCYTES NFR BLD AUTO: 5 % (ref 21–46)
MCHC RBC AUTO-ENTMCNC: 29.6 G/DL (ref 31.5–36.5)
MCV RBC AUTO: 91 FL (ref 80–100)
MONOCYTES # BLD AUTO: 0.28 K/MM3 (ref 0.16–1.47)
MONOCYTES NFR BLD AUTO: 3 % (ref 4–13)
NEUTROPHILS # BLD AUTO: 8.22 K/MM3 (ref 1.96–9.15)
NEUTROPHILS NFR BLD AUTO: 91 % (ref 41–73)
NRBC # BLD AUTO: 0 K/MM3 (ref 0–0.02)
NRBC BLD AUTO-RTO: 0 /100 WBC (ref 0–0.2)
PH BLDV: 7.35 [PH] (ref 7.34–7.37)
PLATELET # BLD AUTO: 189 K/MM3 (ref 150–400)
POTASSIUM SERPL-SCNC: 4.1 MMOL/L (ref 3.5–5.5)
PROT SERPL-MCNC: 6.4 G/DL (ref 6.4–8.2)
RSV RNA SPEC QL NAA+PROBE: NEGATIVE
SARS-COV-2 RNA RESP QL NAA+PROBE: NEGATIVE
SODIUM SERPL-SCNC: 145 MMOL/L (ref 136–145)

## 2024-12-01 PROCEDURE — G0378 HOSPITAL OBSERVATION PER HR: HCPCS

## 2024-12-01 PROCEDURE — A9270 NON-COVERED ITEM OR SERVICE: HCPCS

## 2024-12-01 NOTE — NUR
SHIFT SUMMARY
PT A&OX4 AND ANSWERS QUESTIONS APPROPRIATELY. PT ARRIVED ON UNIT VIA W/C AT
AROUND 1130 AND IS ORIENTED TO THE ROOM AND UNIT. PT ON 4L O2 VIA N/C AND
SWITCHED TO A CPAP FOR NOC AND SLEEP. VSS, NO COMPLAINTS OF CP/PRESSURE OR
SOB. PT REPOSITIONED INDEPENDENTLY. PT SPENT MOST OF SHIFT SITTING UP IN
RECLINER. NO ACUTE EVENTS AT THIS TIME. FALL PRECAUTIONS IN PLACE AND CALL
LIGHT IN REACH. yes...

## 2024-12-01 NOTE — NUR
PT REMAINS IN HOME CLOTHES AT THIS TIME. RYANNE MARSHALL OFFERED TO BRING FRESH
CLOTHES DUE TO HOME CLOTHES BEING SOILED. PT DECLINED TO REMOVE HOME CLOTHES
AND USED A WIPE INSTEAD TO CLEAN OFF PANTS. DESPITE PT TEACHING, HE STILL
DECLINES AT THIS TIME TO CHANGE CLOTHES.

## 2024-12-02 VITALS — SYSTOLIC BLOOD PRESSURE: 162 MMHG | DIASTOLIC BLOOD PRESSURE: 68 MMHG

## 2024-12-02 VITALS — SYSTOLIC BLOOD PRESSURE: 130 MMHG | DIASTOLIC BLOOD PRESSURE: 58 MMHG

## 2024-12-02 VITALS — DIASTOLIC BLOOD PRESSURE: 82 MMHG | SYSTOLIC BLOOD PRESSURE: 168 MMHG

## 2024-12-02 VITALS — DIASTOLIC BLOOD PRESSURE: 62 MMHG | SYSTOLIC BLOOD PRESSURE: 142 MMHG

## 2024-12-02 LAB
ALBUMIN SERPL BCP-MCNC: 2.9 G/DL (ref 3.4–5)
ALBUMIN/GLOB SERPL: 0.8 {RATIO} (ref 0.8–1.8)
ALT SERPL W P-5'-P-CCNC: 17 U/L (ref 12–78)
ANION GAP SERPL CALCULATED.4IONS-SCNC: 8 MMOL/L (ref 3–11)
AST SERPL W P-5'-P-CCNC: 23 U/L (ref 12–37)
BASOPHILS # BLD: 0 K/MM3 (ref 0–0.23)
BASOPHILS NFR BLD: 0 % (ref 0–2)
BILIRUB SERPL-MCNC: 0.7 MG/DL (ref 0.1–1)
BUN SERPL-MCNC: 33 MG/DL (ref 8–24)
CALCIUM SERPL-MCNC: 8.7 MG/DL (ref 8.5–10.1)
CHLORIDE SERPL-SCNC: 106 MMOL/L (ref 98–108)
CO2 SERPL-SCNC: 32 MMOL/L (ref 21–32)
CREAT SERPL-MCNC: 2.4 MG/DL (ref 0.6–1.2)
DEPRECATED RDW RBC AUTO: 55.6 FL (ref 35.1–46.3)
EOSINOPHIL # BLD: 0.39 K/MM3 (ref 0–0.68)
EOSINOPHIL NFR BLD: 2 % (ref 0–6)
ERYTHROCYTE [DISTWIDTH] IN BLOOD BY AUTOMATED COUNT: 16.2 % (ref 11.7–14.2)
GLOBULIN SER CALC-MCNC: 3.5 G/DL (ref 2.2–4)
GLUCOSE SERPL-MCNC: 77 MG/DL (ref 70–99)
HCT VFR BLD AUTO: 39.9 % (ref 37–53)
HGB BLD-MCNC: 11.6 G/DL (ref 13.5–17.5)
LYMPHOCYTES # BLD: 2.77 K/MM3 (ref 0.84–5.2)
LYMPHOCYTES NFR BLD: 14 % (ref 21–46)
MAGNESIUM SERPL-MCNC: 2.8 MG/DL (ref 1.6–2.4)
MCHC RBC AUTO-ENTMCNC: 29.1 G/DL (ref 31.5–36.5)
MCV RBC AUTO: 92 FL (ref 80–100)
MONOCYTES # BLD: 1.18 K/MM3 (ref 0.16–1.47)
MONOCYTES NFR BLD: 6 % (ref 4–13)
NEUTS BAND NFR BLD MANUAL: 22 % (ref 0–8)
NEUTS SEG # BLD MANUAL: 15.45 K/MM3 (ref 1.96–9.15)
NEUTS SEG NFR BLD MANUAL: 56 % (ref 41–73)
NRBC # BLD AUTO: 0 K/MM3 (ref 0–0.02)
NRBC BLD AUTO-RTO: 0 /100 WBC (ref 0–0.2)
PLATELET # BLD AUTO: 177 K/MM3 (ref 150–400)
POTASSIUM SERPL-SCNC: 4.1 MMOL/L (ref 3.5–5.5)
PROT SERPL-MCNC: 6.4 G/DL (ref 6.4–8.2)
SODIUM SERPL-SCNC: 142 MMOL/L (ref 136–145)
TOTAL CELLS COUNTED BLD: 100

## 2024-12-02 NOTE — NUR
SHIFT SUMMARY
 
PT IS A&O X4, ABLE TO MAKE HIS NEEDS KNOWN AND COOPERATIVE WITH CARE. PT
SITTING AND RESTING T/O THE NIGHT HRS IN THE RECLINER. PT C/O FEELING COLD,
TEMP.97.1. HEATING PAD AND BLANKETS OFFERED. O2 @3L, CPAP AT NIGHT HRS. PT
DENIES SOB. PRN TYLENOL ADMINISTERED AT HS FOR RUQ PAIN. PT ABLE TO GET UP AND
USE FWW WITH SBA. NO ACUTE EVENTS DURING THIS SHIFT. CALL LIGHT WITHIN REACH.

## 2024-12-02 NOTE — NUR
SHIFT SUMMARY
PT A&OX4. PT ADMITTED DUE TO ACUTE RESPIRATORY FAILURE. PT REPORTS NO PAIN. PT
REPORTS SOB ON EXERTION. PT ON 3L OF O2 VIA N/C. PT REPORTS THAT AS HIS
BASELINE. PT WAS SITTING IN CHAIR FOR MAJORITY OF SHIFT. PT HAD HIS HOME MEDS
BROUGHT IN TO BE RECONCILED, DR. CHONG NOTIFIED. PT WALKS WITH FWW AND USES
BSC. PT CALLS APPROPRIATELY. CALL LIGHT IN REACH. NO ACUTE CHANGES DURING
SHIFT.

## 2024-12-03 VITALS — DIASTOLIC BLOOD PRESSURE: 72 MMHG | SYSTOLIC BLOOD PRESSURE: 171 MMHG

## 2024-12-03 VITALS — SYSTOLIC BLOOD PRESSURE: 157 MMHG | DIASTOLIC BLOOD PRESSURE: 77 MMHG

## 2024-12-03 VITALS — DIASTOLIC BLOOD PRESSURE: 81 MMHG | SYSTOLIC BLOOD PRESSURE: 149 MMHG

## 2024-12-03 LAB
ALBUMIN SERPL BCP-MCNC: 3.2 G/DL (ref 3.4–5)
ALBUMIN/GLOB SERPL: 0.9 {RATIO} (ref 0.8–1.8)
ALT SERPL W P-5'-P-CCNC: 18 U/L (ref 12–78)
ANION GAP SERPL CALCULATED.4IONS-SCNC: 9 MMOL/L (ref 3–11)
AST SERPL W P-5'-P-CCNC: 24 U/L (ref 12–37)
BASOPHILS # BLD: 0 K/MM3 (ref 0–0.23)
BASOPHILS NFR BLD: 0 % (ref 0–2)
BILIRUB SERPL-MCNC: 0.6 MG/DL (ref 0.1–1)
BUN SERPL-MCNC: 37 MG/DL (ref 8–24)
CALCIUM SERPL-MCNC: 9.4 MG/DL (ref 8.5–10.1)
CHLORIDE SERPL-SCNC: 107 MMOL/L (ref 98–108)
CO2 SERPL-SCNC: 31 MMOL/L (ref 21–32)
CREAT SERPL-MCNC: 2.31 MG/DL (ref 0.6–1.2)
DEPRECATED RDW RBC AUTO: 55.8 FL (ref 35.1–46.3)
EOSINOPHIL # BLD: 0.3 K/MM3 (ref 0–0.68)
EOSINOPHIL NFR BLD: 2 % (ref 0–6)
ERYTHROCYTE [DISTWIDTH] IN BLOOD BY AUTOMATED COUNT: 16.4 % (ref 11.7–14.2)
GLOBULIN SER CALC-MCNC: 3.7 G/DL (ref 2.2–4)
GLUCOSE SERPL-MCNC: 78 MG/DL (ref 70–99)
HCT VFR BLD AUTO: 42 % (ref 37–53)
HGB BLD-MCNC: 12.4 G/DL (ref 13.5–17.5)
LYMPHOCYTES # BLD: 1.08 K/MM3 (ref 0.84–5.2)
LYMPHOCYTES NFR BLD: 7 % (ref 21–46)
MCHC RBC AUTO-ENTMCNC: 29.5 G/DL (ref 31.5–36.5)
MCV RBC AUTO: 92 FL (ref 80–100)
MONOCYTES # BLD: 0.61 K/MM3 (ref 0.16–1.47)
MONOCYTES NFR BLD: 4 % (ref 4–13)
NEUTS BAND NFR BLD MANUAL: 14 % (ref 0–8)
NEUTS SEG # BLD MANUAL: 13.44 K/MM3 (ref 1.96–9.15)
NEUTS SEG NFR BLD MANUAL: 73 % (ref 41–73)
NRBC # BLD AUTO: 0 K/MM3 (ref 0–0.02)
NRBC BLD AUTO-RTO: 0 /100 WBC (ref 0–0.2)
PLATELET # BLD AUTO: 181 K/MM3 (ref 150–400)
POTASSIUM SERPL-SCNC: 3.9 MMOL/L (ref 3.5–5.5)
PROT SERPL-MCNC: 6.9 G/DL (ref 6.4–8.2)
SODIUM SERPL-SCNC: 143 MMOL/L (ref 136–145)
TOTAL CELLS COUNTED BLD: 100

## 2024-12-03 NOTE — NUR
SHIFT SUMMARY
 
PT IS A&O X4, ABLE TO MAKE HIS NEEDS KNOWN, AND COOPERATIVE WITH CARE. PT C/O
OF MILD ANXIETY AND MILD OVERALL BODY ACHES AT HS. PRN PO TYLENOL ADMINISTERED
AS ORDERED. PT REPORTS GOOD EFFECTIVNESS. CONTINUOUS PULSE OX, O2 SAT'S >97%
@3L VIA NASAL CANNULA. PT WEARING C-PAP DURING THE NIGHT HRS. PT CONTINUES IN
SITTING POSITION, SWITCHING FROM THE RECLINER TO THE SIDE OF THE BED. DID NOT
LAY DOWN IN BED TO REST. NO ACUTE EVENTS DURING THIS SHIFT. CALL LIGHT WITHIN
REACH. URINAL BY THE BEDSIDE. PT REPORTS AWAITING FOR A POSSIBLE D/C TODAY.

## 2024-12-03 NOTE — NUR
DISCHARGE SUMMARY:
PT DOISCHARGED HOME TODAY. ANNE BROUGHT PORTABLE O2 TANK FOR PT TO GO HOME
WITH. PT EDUCATED ON DISCHARGE MEDICATIONS AND INSTRUCTIONS. PT V/U. PT
ESCORTED TO POV WITH NEPHEW ROSINA. BELONGINGS INCLUDING HOME MEDICATIONS
LOCKED IN THE DRAWER WAS SENT HOME WITH PT. PT ABLE TO SELF TX TO VEHICLE
WITHOUT DIFFICULTY.

## 2024-12-05 ENCOUNTER — HOSPITAL ENCOUNTER (INPATIENT)
Dept: HOSPITAL 95 - ER | Age: 89
LOS: 5 days | DRG: 177 | End: 2024-12-10
Attending: HOSPITALIST | Admitting: HOSPITALIST
Payer: OTHER GOVERNMENT

## 2024-12-05 VITALS — BODY MASS INDEX: 33.6 KG/M2 | HEIGHT: 71 IN | WEIGHT: 240 LBS

## 2024-12-05 VITALS — DIASTOLIC BLOOD PRESSURE: 77 MMHG | SYSTOLIC BLOOD PRESSURE: 178 MMHG

## 2024-12-05 VITALS — DIASTOLIC BLOOD PRESSURE: 90 MMHG | SYSTOLIC BLOOD PRESSURE: 172 MMHG

## 2024-12-05 DIAGNOSIS — I49.3: ICD-10-CM

## 2024-12-05 DIAGNOSIS — E78.5: ICD-10-CM

## 2024-12-05 DIAGNOSIS — E16.2: ICD-10-CM

## 2024-12-05 DIAGNOSIS — Z79.899: ICD-10-CM

## 2024-12-05 DIAGNOSIS — Z87.19: ICD-10-CM

## 2024-12-05 DIAGNOSIS — N18.4: ICD-10-CM

## 2024-12-05 DIAGNOSIS — I13.0: ICD-10-CM

## 2024-12-05 DIAGNOSIS — K22.10: ICD-10-CM

## 2024-12-05 DIAGNOSIS — K21.9: ICD-10-CM

## 2024-12-05 DIAGNOSIS — I25.2: ICD-10-CM

## 2024-12-05 DIAGNOSIS — Z66: ICD-10-CM

## 2024-12-05 DIAGNOSIS — J96.21: ICD-10-CM

## 2024-12-05 DIAGNOSIS — Z86.73: ICD-10-CM

## 2024-12-05 DIAGNOSIS — Z95.1: ICD-10-CM

## 2024-12-05 DIAGNOSIS — G93.41: ICD-10-CM

## 2024-12-05 DIAGNOSIS — I25.10: ICD-10-CM

## 2024-12-05 DIAGNOSIS — K31.819: ICD-10-CM

## 2024-12-05 DIAGNOSIS — I50.43: ICD-10-CM

## 2024-12-05 DIAGNOSIS — J44.0: ICD-10-CM

## 2024-12-05 DIAGNOSIS — Z95.5: ICD-10-CM

## 2024-12-05 DIAGNOSIS — F41.8: ICD-10-CM

## 2024-12-05 DIAGNOSIS — E66.9: ICD-10-CM

## 2024-12-05 DIAGNOSIS — Z90.49: ICD-10-CM

## 2024-12-05 DIAGNOSIS — Z87.891: ICD-10-CM

## 2024-12-05 DIAGNOSIS — Z98.42: ICD-10-CM

## 2024-12-05 DIAGNOSIS — J69.0: Primary | ICD-10-CM

## 2024-12-05 DIAGNOSIS — Z85.46: ICD-10-CM

## 2024-12-05 DIAGNOSIS — Z79.02: ICD-10-CM

## 2024-12-05 DIAGNOSIS — Z51.5: ICD-10-CM

## 2024-12-05 DIAGNOSIS — K22.2: ICD-10-CM

## 2024-12-05 DIAGNOSIS — Z98.41: ICD-10-CM

## 2024-12-05 DIAGNOSIS — N17.9: ICD-10-CM

## 2024-12-05 DIAGNOSIS — Z86.19: ICD-10-CM

## 2024-12-05 DIAGNOSIS — Z92.3: ICD-10-CM

## 2024-12-05 DIAGNOSIS — D63.1: ICD-10-CM

## 2024-12-05 DIAGNOSIS — Z98.52: ICD-10-CM

## 2024-12-05 DIAGNOSIS — G47.33: ICD-10-CM

## 2024-12-05 LAB
ALBUMIN SERPL BCP-MCNC: 3.2 G/DL (ref 3.4–5)
ALBUMIN/GLOB SERPL: 0.8 {RATIO} (ref 0.8–1.8)
ALT SERPL W P-5'-P-CCNC: 18 U/L (ref 12–78)
ANION GAP SERPL CALCULATED.4IONS-SCNC: 11 MMOL/L (ref 3–11)
AST SERPL W P-5'-P-CCNC: 17 U/L (ref 12–37)
BASOPHILS # BLD AUTO: 0.04 K/MM3 (ref 0–0.23)
BASOPHILS NFR BLD AUTO: 1 % (ref 0–2)
BILIRUB SERPL-MCNC: 0.7 MG/DL (ref 0.1–1)
BUN SERPL-MCNC: 47 MG/DL (ref 8–24)
CALCIUM SERPL-MCNC: 9.2 MG/DL (ref 8.5–10.1)
CHLORIDE SERPL-SCNC: 105 MMOL/L (ref 98–108)
CO2 SERPL-SCNC: 29 MMOL/L (ref 21–32)
CREAT SERPL-MCNC: 2.31 MG/DL (ref 0.6–1.2)
DEPRECATED RDW RBC AUTO: 54.5 FL (ref 35.1–46.3)
EOSINOPHIL # BLD AUTO: 0.13 K/MM3 (ref 0–0.68)
EOSINOPHIL NFR BLD AUTO: 2 % (ref 0–6)
ERYTHROCYTE [DISTWIDTH] IN BLOOD BY AUTOMATED COUNT: 16 % (ref 11.7–14.2)
GLOBULIN SER CALC-MCNC: 4.1 G/DL (ref 2.2–4)
GLUCOSE SERPL-MCNC: 60 MG/DL (ref 70–99)
HCT VFR BLD AUTO: 43.3 % (ref 37–53)
HGB BLD-MCNC: 12.5 G/DL (ref 13.5–17.5)
IMM GRANULOCYTES # BLD AUTO: 0.06 K/MM3 (ref 0–0.1)
IMM GRANULOCYTES NFR BLD AUTO: 1 % (ref 0–1)
LYMPHOCYTES # BLD AUTO: 0.84 K/MM3 (ref 0.84–5.2)
LYMPHOCYTES NFR BLD AUTO: 10 % (ref 21–46)
MAGNESIUM SERPL-MCNC: 2.8 MG/DL (ref 1.6–2.4)
MCHC RBC AUTO-ENTMCNC: 28.9 G/DL (ref 31.5–36.5)
MCV RBC AUTO: 93 FL (ref 80–100)
MONOCYTES # BLD AUTO: 0.45 K/MM3 (ref 0.16–1.47)
MONOCYTES NFR BLD AUTO: 5 % (ref 4–13)
NEUTROPHILS # BLD AUTO: 6.78 K/MM3 (ref 1.96–9.15)
NEUTROPHILS NFR BLD AUTO: 82 % (ref 41–73)
NRBC # BLD AUTO: 0 K/MM3 (ref 0–0.02)
NRBC BLD AUTO-RTO: 0 /100 WBC (ref 0–0.2)
PLATELET # BLD AUTO: 190 K/MM3 (ref 150–400)
POTASSIUM SERPL-SCNC: 4.4 MMOL/L (ref 3.5–5.5)
PROT SERPL-MCNC: 7.3 G/DL (ref 6.4–8.2)
SARS-COV-2 RNA RESP QL NAA+PROBE: NEGATIVE
SODIUM SERPL-SCNC: 141 MMOL/L (ref 136–145)

## 2024-12-05 PROCEDURE — U0002 COVID-19 LAB TEST NON-CDC: HCPCS

## 2024-12-05 PROCEDURE — G0378 HOSPITAL OBSERVATION PER HR: HCPCS

## 2024-12-05 PROCEDURE — C1751 CATH, INF, PER/CENT/MIDLINE: HCPCS

## 2024-12-05 PROCEDURE — A9270 NON-COVERED ITEM OR SERVICE: HCPCS

## 2024-12-05 NOTE — NUR
SHIFT SUMMARY
PT A&OX4 AND ANSWERS QUESTIONS APPROPRIATELY. PT ARRIVED ON UNIT AROUND 1350
AND WAS ORIENTED TO THE ROOM AND UNIT. PT EXPERIENCES SOME SOB WITH EXCERTION.
VSS, NO COMPLAINTS OF CP/PRESSURE OR SOB. PT ON TELEMETRY RUNNING SINUS W/ A
BBB, 1ST DEGREE, AND PACS. NO ACUTE EVENTS AT THIS TIME. PT LEFT IN A POSITION
OF SAFETY WITH FALL PRECAUTIONS IN PLACE AND CALL LIGHT IN REACH. REPOSITIONED
Q2HRS

## 2024-12-05 NOTE — NUR
PT ARRIVED IN ROOM AT AROUND 1350 VIA GURNEY AND WAS TRANSFERED VIA SLIDESHEET
ONTO BED. PT ABLE TO STAND WITH A SBA. DISCUSSED W/ PT AND FAMILY REGARDING
POSSIBLE REHAB FOR PT. FAMILY AND PT VOICES THEY WOULD LIKE PT TO GO TO [facility], NOT TO [facility]. WILL DISCUSS W/ CARE MANAGEMENT
AND

## 2024-12-06 VITALS — SYSTOLIC BLOOD PRESSURE: 156 MMHG | DIASTOLIC BLOOD PRESSURE: 82 MMHG

## 2024-12-06 VITALS — DIASTOLIC BLOOD PRESSURE: 78 MMHG | SYSTOLIC BLOOD PRESSURE: 145 MMHG

## 2024-12-06 VITALS — DIASTOLIC BLOOD PRESSURE: 85 MMHG | SYSTOLIC BLOOD PRESSURE: 177 MMHG

## 2024-12-06 VITALS — DIASTOLIC BLOOD PRESSURE: 77 MMHG | SYSTOLIC BLOOD PRESSURE: 172 MMHG

## 2024-12-06 VITALS — SYSTOLIC BLOOD PRESSURE: 156 MMHG | DIASTOLIC BLOOD PRESSURE: 67 MMHG

## 2024-12-06 LAB
ANION GAP SERPL CALCULATED.4IONS-SCNC: 11 MMOL/L (ref 3–11)
BASOPHILS # BLD AUTO: 0.04 K/MM3 (ref 0–0.23)
BASOPHILS NFR BLD AUTO: 1 % (ref 0–2)
BUN SERPL-MCNC: 48 MG/DL (ref 8–24)
CALCIUM SERPL-MCNC: 9.4 MG/DL (ref 8.5–10.1)
CHLORIDE SERPL-SCNC: 105 MMOL/L (ref 98–108)
CO2 SERPL-SCNC: 29 MMOL/L (ref 21–32)
CREAT SERPL-MCNC: 2.15 MG/DL (ref 0.6–1.2)
DEPRECATED RDW RBC AUTO: 52.9 FL (ref 35.1–46.3)
EOSINOPHIL # BLD AUTO: 0.17 K/MM3 (ref 0–0.68)
EOSINOPHIL NFR BLD AUTO: 3 % (ref 0–6)
ERYTHROCYTE [DISTWIDTH] IN BLOOD BY AUTOMATED COUNT: 15.7 % (ref 11.7–14.2)
GLUCOSE SERPL-MCNC: 48 MG/DL (ref 70–99)
HCT VFR BLD AUTO: 39.8 % (ref 37–53)
HGB BLD-MCNC: 11.6 G/DL (ref 13.5–17.5)
IMM GRANULOCYTES # BLD AUTO: 0.04 K/MM3 (ref 0–0.1)
IMM GRANULOCYTES NFR BLD AUTO: 1 % (ref 0–1)
LYMPHOCYTES # BLD AUTO: 0.71 K/MM3 (ref 0.84–5.2)
LYMPHOCYTES NFR BLD AUTO: 11 % (ref 21–46)
MCHC RBC AUTO-ENTMCNC: 29.1 G/DL (ref 31.5–36.5)
MCV RBC AUTO: 91 FL (ref 80–100)
MONOCYTES # BLD AUTO: 0.68 K/MM3 (ref 0.16–1.47)
MONOCYTES NFR BLD AUTO: 10 % (ref 4–13)
NEUTROPHILS # BLD AUTO: 5.05 K/MM3 (ref 1.96–9.15)
NEUTROPHILS NFR BLD AUTO: 76 % (ref 41–73)
NRBC # BLD AUTO: 0 K/MM3 (ref 0–0.02)
NRBC BLD AUTO-RTO: 0 /100 WBC (ref 0–0.2)
PLATELET # BLD AUTO: 205 K/MM3 (ref 150–400)
POTASSIUM SERPL-SCNC: 4 MMOL/L (ref 3.5–5.5)
SODIUM SERPL-SCNC: 141 MMOL/L (ref 136–145)

## 2024-12-06 PROCEDURE — 3E03329 INTRODUCTION OF OTHER ANTI-INFECTIVE INTO PERIPHERAL VEIN, PERCUTANEOUS APPROACH: ICD-10-PCS | Performed by: HOSPITALIST

## 2024-12-06 PROCEDURE — 5A09457 ASSISTANCE WITH RESPIRATORY VENTILATION, 24-96 CONSECUTIVE HOURS, CONTINUOUS POSITIVE AIRWAY PRESSURE: ICD-10-PCS | Performed by: HOSPITALIST

## 2024-12-06 NOTE — NUR
Shift Summary
 
Pt had emesis near the start of the shift after he ate dinner. He stated that
he hasn't been able to keep a meal down d/t emesis for 24 hours although he
admits he is forgetful and is not sure. I called the hospitalist who said he
would review the chart, no new orders. Later that night pt had emesis without
a meal, I called the hospitalist who ordered 10mg Compazine IV q6 PRN. I gave
one dose. Pt's AM lab came back with a critical glucose of 48. I encouraged
applejuice and lucy crackers, pt quickly drank 2 applejuices and 2 crackers
and then started sipping ensure. He states no nausea this AM but he is feeling
light headed. I called the hospitalist who said as long as he keeps the food
down then no new orders. Follow up PoC glucose 20 minutes later was 80. Pt is
refusing cont. biox and refusal is signed and in the chart. He slept sitting
upright in a recliner with his CPAP on. On tele running SR w/ a BBB. Pt is
AOx3 with some foregetfulness.

## 2024-12-06 NOTE — NUR
SHIFT SUMMARY
PT CONT LEVEL OF CARE WITH NO ACUTE CHANGES NOTED. PT IS A&OX4 AND A SBA WITH
AMBULATION. PT NOTED TO BE CONT/INCONT OF BLADDER THIS SHIFT. PHYSICAN WANTED
TO MONITOR BLOOD GLUCOSE THIS SHIFT DT CRITICAL GLUCOSE LEVEL WITH AM LABS. PT
HAS HAD EMISIS X1 THIS SHIFT AFTER WORKING WITH SPEECH THERAPY. PT AS A
CONSULT WITH GI AND AWAITING GI PHYSICIAN DR VOGEL TO SEE PT AS CALL WAS
PLACED THIS MORNING AND MESSAGE WAS LEFT WITH A NURSE. PT NOTED TO SLEEP
INTERMITTENLY THROUGH THIS SHIFT WITH CPAP IN PLACE.

## 2024-12-06 NOTE — NUR
PHYSCIAN NOTIFED D/T TELEMENTRY CALLED AND STATED PT WAS HAVING RUNS OF
BIGEMINY. PHYSICAN GAVE TELEPHONE ORDER FOR MAGNESIUM LAB TO BE DRAWN AND TO
NOTIFY PHYSICAN IF LAB IS LOW.

## 2024-12-07 VITALS — DIASTOLIC BLOOD PRESSURE: 69 MMHG | SYSTOLIC BLOOD PRESSURE: 149 MMHG

## 2024-12-07 VITALS — DIASTOLIC BLOOD PRESSURE: 64 MMHG | SYSTOLIC BLOOD PRESSURE: 141 MMHG

## 2024-12-07 VITALS — DIASTOLIC BLOOD PRESSURE: 57 MMHG | SYSTOLIC BLOOD PRESSURE: 145 MMHG

## 2024-12-07 VITALS — SYSTOLIC BLOOD PRESSURE: 141 MMHG | DIASTOLIC BLOOD PRESSURE: 70 MMHG

## 2024-12-07 VITALS — DIASTOLIC BLOOD PRESSURE: 74 MMHG | SYSTOLIC BLOOD PRESSURE: 148 MMHG

## 2024-12-07 LAB
ANION GAP SERPL CALCULATED.4IONS-SCNC: 9 MMOL/L (ref 3–11)
BASOPHILS # BLD AUTO: 0.06 K/MM3 (ref 0–0.23)
BASOPHILS NFR BLD AUTO: 1 % (ref 0–2)
BUN SERPL-MCNC: 50 MG/DL (ref 8–24)
CALCIUM SERPL-MCNC: 9.7 MG/DL (ref 8.5–10.1)
CHLORIDE SERPL-SCNC: 106 MMOL/L (ref 98–108)
CO2 SERPL-SCNC: 33 MMOL/L (ref 21–32)
CREAT SERPL-MCNC: 2.24 MG/DL (ref 0.6–1.2)
DEPRECATED RDW RBC AUTO: 53.6 FL (ref 35.1–46.3)
EOSINOPHIL # BLD AUTO: 0.21 K/MM3 (ref 0–0.68)
EOSINOPHIL NFR BLD AUTO: 4 % (ref 0–6)
ERYTHROCYTE [DISTWIDTH] IN BLOOD BY AUTOMATED COUNT: 15.9 % (ref 11.7–14.2)
GLUCOSE SERPL-MCNC: 106 MG/DL (ref 70–99)
HCT VFR BLD AUTO: 39.3 % (ref 37–53)
HGB BLD-MCNC: 11.5 G/DL (ref 13.5–17.5)
IMM GRANULOCYTES # BLD AUTO: 0.09 K/MM3 (ref 0–0.1)
IMM GRANULOCYTES NFR BLD AUTO: 2 % (ref 0–1)
LYMPHOCYTES # BLD AUTO: 0.79 K/MM3 (ref 0.84–5.2)
LYMPHOCYTES NFR BLD AUTO: 14 % (ref 21–46)
MCHC RBC AUTO-ENTMCNC: 29.3 G/DL (ref 31.5–36.5)
MCV RBC AUTO: 92 FL (ref 80–100)
MONOCYTES # BLD AUTO: 0.82 K/MM3 (ref 0.16–1.47)
MONOCYTES NFR BLD AUTO: 15 % (ref 4–13)
NEUTROPHILS # BLD AUTO: 3.7 K/MM3 (ref 1.96–9.15)
NEUTROPHILS NFR BLD AUTO: 65 % (ref 41–73)
NRBC # BLD AUTO: 0 K/MM3 (ref 0–0.02)
NRBC BLD AUTO-RTO: 0 /100 WBC (ref 0–0.2)
PLATELET # BLD AUTO: 199 K/MM3 (ref 150–400)
POTASSIUM SERPL-SCNC: 3.9 MMOL/L (ref 3.5–5.5)
SODIUM SERPL-SCNC: 144 MMOL/L (ref 136–145)

## 2024-12-07 PROCEDURE — 0DJ08ZZ INSPECTION OF UPPER INTESTINAL TRACT, VIA NATURAL OR ARTIFICIAL OPENING ENDOSCOPIC: ICD-10-PCS | Performed by: INTERNAL MEDICINE

## 2024-12-07 NOTE — NUR
SHIFT SUMMARY
PT RESTING QUIETLY IN CHAIR AT BS DURING SHIFT REPORT. WOKE EASILY, BUT [place].
PT NPO FOR EGD EXCEPT WATER AND ICE, CHANGING TO COMPLETE NPO AT 10:00, PER
ORDERS. PT CONTINENT AND INCONTINENT OF BOWEL AND BLADDER TODAY. PT CONTINENT
PRIOR TO PROCEDURE, BUT PER POST OP REPORT, PT INCONTINENT DURING PROCEDURE.
PT CLEANED AND CHANGED AFTER EGD. DR DERAS IN TO SEE PT THIS AM, JUST PRIOR
TO EGD, EXPLAINING PLAN OF CARE. PT REMAINED IN CHAIR UNTIL BEING TAKEN DOWN
FOR EGD. PT RETURNED DROWSY; SLIDE TX TO BED. PT REQUESTING WARM BLANKETS;
GIVEN. PT HAS RESTED QUIETLY SINCE RETURNING THIS AFTERNOON. PT'S FRIEND HERE
WHEN PT RETURNED. FRIENDS PHONE NUMBER TAKEN TO GIVE TO DR VOGEL TO CALL FOR
EGD RESULTS. DR DERAS UPDATED ON PT'S RETURN FROM PROCEDURE AS WELL AS YEAST
IN ABD SKIN FOLDS; NEW ORDERS PLACED FOR NYSTATIN. PALLIATIVE CARE CONSULT
ADDED AS WELL. PT NOW ON CL DIET FOR DINNER. BED ALARM ON FOR SAFETY. CALL LT
IN REACH.

## 2024-12-07 NOTE — NUR
Shift Summary
 
Pt has been NPO except for water since 0000 per MD order in anticipation of
EGD today. No emesis this shift. Blood glucose checks d/c'd per order
instructions. Pt remained in the chair t/o most of this shift, he wore his
CPAP w/ 4L bleed in t/o most of the night. Per tele, some atrial escape beats
with temporary HR in the upper 30's while pt is asleep. Per report MD is aware
of bradycardia while pt is asleep and asked us to call of it happens and
is sustained while pt is awake. No acute changes this shift.

## 2024-12-08 VITALS — DIASTOLIC BLOOD PRESSURE: 61 MMHG | SYSTOLIC BLOOD PRESSURE: 139 MMHG

## 2024-12-08 VITALS — SYSTOLIC BLOOD PRESSURE: 123 MMHG | DIASTOLIC BLOOD PRESSURE: 65 MMHG

## 2024-12-08 VITALS — DIASTOLIC BLOOD PRESSURE: 98 MMHG | SYSTOLIC BLOOD PRESSURE: 121 MMHG

## 2024-12-08 LAB
ANION GAP SERPL CALCULATED.4IONS-SCNC: 6 MMOL/L (ref 3–11)
BASOPHILS # BLD AUTO: 0.08 K/MM3 (ref 0–0.23)
BASOPHILS NFR BLD AUTO: 1 % (ref 0–2)
BUN SERPL-MCNC: 46 MG/DL (ref 8–24)
CALCIUM SERPL-MCNC: 9.7 MG/DL (ref 8.5–10.1)
CHLORIDE SERPL-SCNC: 108 MMOL/L (ref 98–108)
CO2 SERPL-SCNC: 36 MMOL/L (ref 21–32)
CREAT SERPL-MCNC: 2.11 MG/DL (ref 0.6–1.2)
DEPRECATED RDW RBC AUTO: 54.4 FL (ref 35.1–46.3)
EOSINOPHIL # BLD AUTO: 0.2 K/MM3 (ref 0–0.68)
EOSINOPHIL NFR BLD AUTO: 3 % (ref 0–6)
ERYTHROCYTE [DISTWIDTH] IN BLOOD BY AUTOMATED COUNT: 15.8 % (ref 11.7–14.2)
GLUCOSE SERPL-MCNC: 106 MG/DL (ref 70–99)
HCT VFR BLD AUTO: 43.2 % (ref 37–53)
HGB BLD-MCNC: 12.3 G/DL (ref 13.5–17.5)
IMM GRANULOCYTES # BLD AUTO: 0.15 K/MM3 (ref 0–0.1)
IMM GRANULOCYTES NFR BLD AUTO: 2 % (ref 0–1)
LYMPHOCYTES # BLD AUTO: 0.73 K/MM3 (ref 0.84–5.2)
LYMPHOCYTES NFR BLD AUTO: 9 % (ref 21–46)
MCHC RBC AUTO-ENTMCNC: 28.5 G/DL (ref 31.5–36.5)
MCV RBC AUTO: 95 FL (ref 80–100)
MONOCYTES # BLD AUTO: 0.72 K/MM3 (ref 0.16–1.47)
MONOCYTES NFR BLD AUTO: 9 % (ref 4–13)
NEUTROPHILS # BLD AUTO: 6.09 K/MM3 (ref 1.96–9.15)
NEUTROPHILS NFR BLD AUTO: 76 % (ref 41–73)
NRBC # BLD AUTO: 0 K/MM3 (ref 0–0.02)
NRBC BLD AUTO-RTO: 0 /100 WBC (ref 0–0.2)
PLATELET # BLD AUTO: 210 K/MM3 (ref 150–400)
POTASSIUM SERPL-SCNC: 4 MMOL/L (ref 3.5–5.5)
SODIUM SERPL-SCNC: 146 MMOL/L (ref 136–145)

## 2024-12-08 NOTE — NUR
UPDATE CODE STATUS TO DNR
NEW POLST FILLED OUT WITH PATIENT TO REFLECT DNR/SELECTIVE MEDICAL TREATMENTS.
WHEN ASKED ABOUT INTUBATION AND FEEDING TUBE, HE RESPONDED WITH "WELL THAT'S
NO LIFE AT ALL."
 
THIS PC RN OBTAINED PERMISSION FROM PT TO CALL HIS HEALTH CARE PROXY/NEPHEW,
ROSINA TO PROVIDE HEALTH UPDATES. PROVIDER ENTERED ROOM DURING THIS PC VISIT.
PROVIDER ASKED PT ABOUT A SHORT TERM (APX 3 MTH) PEG TUBE D/T ESOPHAGEAL
STRICTURE, PT ASKED TO ANSWER TUBE PLACEMENT LATER. PT IS HYPERFOCUSED ON
DRINKING WATER OR CHEWING ON ICE CHIPS. RISKS HAVE BEEN EXPLAINED TO PT
MULTIPULE TIMES. THIS PC RN PROVIDED ORAL SWAB TO WET HIS MOUTH.
 
ATTEMPTED TO REACH NEPHEW, ROSINA BY PHONE. ROSINA'S VM HAS NOT BEEN SET UP. PT
REPORTS ROSINA IS VERY [place].

## 2024-12-08 NOTE — NUR
NIGHT SHIFT SUMMARY
 
RESULTS FROM EGD WERE NOT AVAILABLE TO NURSING OVERNIGHT, BUT PT CONTINUES TO
HAVE THE SAME PROBLEM, POSSIBLY EVEN GETTING WORSE. WE CRUSHED HIS PILLS AT
BEDTIME BUT AFTER ABOUT 25-30 MINUTES HE VOMITED THEM UP. HE DOES NOT FEEL
NAUSEATED BEFORE HE VOMITS. HE ALSO VOMITED AFTER DRINKING SOME WATER AND WAS
ABLE TO RIP OFF HIS HOME CPAP MASK AND USED HIS CPAP MASK LIKE A AN EMESIS
BASIN. UNCLEAR HOW MUCH HE HAS ASPIRATED BUT HE REMAINS ON THE SAME AMOUNT OF
OXYGEN (3L) AND WEVE KEPT HIM ON CONTINUOUS SPO2 MONITORING THROUGHOUT THE
NIGHT. HE HAS BEEN A 2 ASSIST TO THE BSC BECAUSE OF INCREASED WEAKNESS.
NOTIFIED DR AKHTAR WHO RECOMMENDS STRICT NPO UNTIL HE CAN BE SEEN BY HIS
DAY SHIFT DOCTOR, WHO CAN USE THE RESULTS OF THE EGD TO EXPLAIN OPTIONS TO
PATIENT. IN THE MEANTIME, MD ORDERS NS @75 X 1 LITER WHILE WE ARE WAITING TO
CLARIFY LONG TERM PLAN OF CARE.

## 2024-12-08 NOTE — NUR
SHIFT SUMMARY
PT SLEEPING IN RECLINER CHAIR WITH CPAP ON DURING SHIFT REPORT. PT MADE STRICT
NPO ON NOC SHIFT AFTER VOMITING CRUSHED MEDS IN APPLESAUCE AS WELL AS A SIP OF
WATER. PT UNABLE TO TOLERATE ANY PO INTAKE AT ALL. DR DERAS AND PALLIATIVE
CARE NOTIFIED AND UPDATED ON PT STATUS. NEW ORDERS PLACED. PALLIATIVE CARE RN
TO  TO TALK WITH PT AND LATER DR DERAS AS WELL. NEW ORDERS PLACED. PT NOW
DNR. PALLIATIVE CARE RN ATTEMPTED SEVERAL TIMES TO SPEAK WITH NEPHEW, BUT
UNABLE TO REACH HIM. NEPHEW, ROSINA HOLLINS, HERE THIS AFTERNOON; DR DERAS ABLE
TO MEET WITH HIM AND DISCUSS PLAN OF CARE. PT WAS RECEIVING IVF'S AND IV ABX,
BUT LOST IV ACCESS TRYING TO GET UP FROM BSC WITHOUT ASSIST. BN ATTEMPTED TO
PLACE NEW IV; UNSUCCESSFUL. CHRG RN NOTIFIED AND WILL ATTEMPT. PT REMAINS IN
CHAIR AT . PT DOES NOT WANT TO REST IN BED AT ALL. CHAIR ALARM IN PLACE.
CALL LT IN REACH.

## 2024-12-08 NOTE — NUR
COMPLETED POLST FORM FAXED TO [place] [facility], [employer] [facility] [employer]. COPY AND ORIGINAL PLACED ON PT CHART. COPY SENT TO [employer]
FOR SCANNING TO CHART.

## 2024-12-09 NOTE — NUR
DR VOGEL BY THIS EVENING TO SEE PT AND REVIEW PLAN OF CARE. DR VOGEL UPDATED
ON PT'S CHOICE OF COMFORT CARE. DR VOGEL AGREEABLE WITH PT'S DECISION. PT
RESTING QUIETLY AT THIS TIME. INCONTINENT OF BOWEL AND BLADDER; CLEANED AND
CHANGED. CALL LT IN REACH. BED ALARM ON FOR SAFETY.

## 2024-12-09 NOTE — NUR
CALLED TO ROOM BY PRIMARY RN TO SPEAK WITH PT'S NEPHEW, ROSINA RE: GOALS OF
CARE.
 
DURING THIS VISIT, ROSINA ASKED HAMZAH MULTIPULE TIMES IF HE WANTED A FEEDING
TUBE. HAMZAH RECANTED TO HIS NEPHEW THAT HE HAD TO TAKE CARE OF HIS BROTHER
IN-LAWS PEG TUBE FOR 3 YEARS. "THAT IS NO LIFE AT ALL." HAMZAH ALSO REPORTS, ORAL
INTAKE IS VITAL TO HIS HAPPINESS. "I DON'T WANT A TUBE".
 
AFTER THIS PC RN LEFT THE ROOM, PRIMARY RN CALLED THIS PC RN TO COME BACK TO
THE ROOM AS PT'S NEPHEW HAD CHANGED HIS MIND TO MOVE FORWARD WITH PEG TUBE
PLACEMENT.
 
PT IS A/O X4. ABLE TO MAKE NEEDS KNOWN. FREQUENTLY ASKS FOR ICE CHIPS AND
WATER.
 
NEPHEW, ROSINA ATTEMPTED TO CONVINCE HAMZAH TO PROCEED WITH PEG TUBE PLACEMENT.
WHEN THIS PC RN ASKED HAMZAH IF HE WAS GOING TO REFUSE PEG TUBE ONCE ROSINA LEFT,
HE RESPONDED WITH "YEAH". ROSINA ASKED HAMZAH ABOUT CHANGING HIS MIND WHEN HE
LEAVES. HAMZAH CONFIRMED HE WOULD DECLINE TUBE ONCE ROSINA LEFT.
 
ROSINA VERBALIZED WANTING TO HONOR HIS UNCLES WISHES. CONCLUSION OF VISIT, PT
TO REMAIN ON COMFORT CARE AND DISCHARGE TO A MEDICAL FACILITY FOR HOSPICE
CARE.
 
PRIMARY RN, CARE MANAGER AND PROVIDER UPDATED.

## 2024-12-09 NOTE — NUR
pt sitting up on the side of the bed taking some ice chips and spittig the
water out, he is a/ox4, very Native, cooperative with care, follows commands
well, denies pain at this time, was made comfort care durring the night at his
insistance, currently on 3 liters o2 via n/c, reps even and unlabored, breath
sounds are clear but pretty dim t/o, no cough noted, hrr, 2+ edema noted to
b/l le, ppp unable to be palpated, cap refill< 3 sec, vs stable, afebrile,
power glide to diana site is clear and patent, btx4, abd flat soft nontender,
has brief on for incont, skin has pvd dark from calves down, otherwise ok,
maew, weak, jose, call light in reach.

## 2024-12-09 NOTE — NUR
DR VOGEL BY THIS EVENING TO SEE PT AND AGREEABLE THAT PT IS NOW COMFORT CARE.
PT CONTINUES TO REST QUIETLY, BUT WILL WAKE FOR CARE. CHANGED FOR
INCONTINENCE AS NEEDED.

## 2024-12-09 NOTE — NUR
TRANSITION TO COMFORT CARE AND SHIFT SUMMARY
 
AT START OF SHIFT MR PEGUERO WAS ON 3L OF O2. AS THE SHIFT PROGRESSED HIS
OXYGEN REQUIRMENT STARTED GRADUALLY INCREASING UNTIL HE WAS REQUIRING 15L WITH
HIS CPAP. RESPIRATORY THERAPY ASSISTED WITH INTERVENTIONS. EVENTUALLY PT HAD
BECOME CPAP DEPENDENT, EVEN WHILE AWAKE. WITHOUT HIS CPAP, HE WOULD DESAT TO
THE LOW 80S. HE KEPT BEGGING FOR WATER AND FOR HIS CPAP MASK TO BE REMOVED AND
WE WERE UNABLE TO ALLOW THIS FOR HIS SAFETY. HE CALLED ME IN THE ROOM AT 0030
AND, WHILE ORIENTED X 4, HE SAID HE HAD MADE A BIG MISTAKE DURING THE DAY WHEN
HE HAD REFUSED HOSPICE/COMFORT CARE AND AGREED TO A FEEDING TUBE. HE SAID HE
HAD WANTED
TO TRY THE FEEDING TUBE FOR HIS NEPHEW AND THAT HE DIDNT WANT ANYONE TO
THINK HE HAD "GIVEN UP" BUT THAT HE WAS READY TO START COMFORT CARE AND THAT
HE DOESNT WANT A FEEDING TUBE ANYMORE.
WE HAD A
LONG CONVERSATION ABOUT HIS OPTIONS. DALE MARSHALL, CAS HALE WAS WITNESS
TO THIS CONVERSATION. PT WANTED ME TO CALL HIS NEPHEW AND CALL THE DOCTOR AND
CHANGE HIS STATUS BECAUSE HE SAID "I CANT DO THIS ANYMORE". I ENCOURAGED HIM
TO WAIT UNTIL THE MORNING AND DISCUSS THIS WITH HIS NEPHEW AND HIS DOCTOR AND
HE ORIGINALLY AGREED TO THIS PLAN. BUT AFTER A COUPLE MORE HOURS OF SUFFERING,
HE KEPT CALLING ME IN THE ROOM AND SAYING, "PLEASE, I CANT BREATHE, I NEED TO
DRINK WATER, I NEED MY MASK OFF". "I CANT DO THIS ANYMORE". I CALLED HIS
NEPHEW 4 TIMES WITH NO RESPONSES. PT WAS VERY LUCID AND ANSWERED ALL
ORIENTATION QUESTIONS APPROPRIATELY AND WAS ADAMENT THAT HE WAS READY TO "CALL
IT QUITS". I CALLED DR AKHTAR, WHO AGREED TO MAKE HIM COMFORT CARE PER HIS
WISHES. WE GAVE HIM A DOSE OF ROXANOL FOR HIS AIR HUNGER AND ALLOWED HIM TO
REMOVE HIS CPAP. LALTIHA LEFT HIM ON 3L O2 SO THAT HE MAY BE LUCID ENOUGH TO
SPEAK WITH HIS NEPHEW TODAY. HE SAYS HE IS WORRIED ABOUT WHAT HIS NEPHEW WILL
THINK. PETERSIMON LET HIM HAVE A FEW SIPS OF WATER AND HE SPITS IT RIGHT BACK UP
AGAIN.

## 2024-12-09 NOTE — NUR
SHIFT SUMMARY
1230 ASSUMED CARE OF PT, SITTING UP TO EOB. DR SANTANA HERE TO SEE PT AND
DISCUSS PLAN OF CARE. PT NOW ON COMFORT CARE, DECLINING TO HAVE PEG TUBE
PLACED. PT'S NEPHEW NOTIFIED AND TO . PALLIATIVE CARE TO RM AS WELL TO TALK
WITH PT AND NEPHEW; ALL AGREEABLE TO PT DECLINING PEG TUBE AND BECOMING
COMFORT CARE. CARE MANAGER NOTIFIED AND LATER TO RM TO SEE PT AND EXPLAIN D[facility]. PT AGREEABLE. PT HAS BEEN EATING ICE AND SIPS OF ICE WATER
SINCE BECOMING COMFORT CARE. PT ALSO REQUESTED POPSICLE. PT NOTED TO BE
CHOKING AFTER SIPS OF ALL LIQUID. PT USING SUCTION, SELF, AS NEEDED. CLEANED
AND CHANGED FOR INCONTINENCE. RESTING QUIETLY IN BED AT THIS TIME. HOB
ELEVATED FOR COMFORT. CALL LT IN REACH.

## 2024-12-09 NOTE — NUR
SYMPTOM MANAGEMENT AND CLARIFICATION OF COMFORT CARE STATUS WITH PATIENT. THIS
PC RN SPOKE WITH NOC SHIFT RN, WANG, REVIEWED PT REQUEST FOR
HOSPICE/COMFORT CARE AND HIS CONTINUED REQUEST FOR WATER OR ICE CHIPS.
 
PT LIKES TO BE ADDRESSED AS "HAMZAH". HAMZAH IS A/O X4. ABLE TO MAKE NEEDS KNOWN. PT
IS NOT ABLE TO TOLERATE ICE CHIPS OR WATER. SEVERE DYSPHAGIA NOTED. PT
VERBALIZES UNDERSTANDING THE RISKS OF PO INTAKE AND REFUSAL OF PEG TUBE. A FEW
MOMENTS AFTER PO INTAKE, HE REGURITATES ALL FLUIDS. NO GASTRIC JUICES/BILE
NOTED. PT HAS DIFFICULTY HOLDING HIS HEAD UP FOR MORE THAN 10 SECONDS. UNKNOWN
IF THIS IS BASELINE.
 
2 WEEKS AGO: AMBULATING WITH WALKER, PREPARING 2-3 MEALS DAILY, CARING FOR HIS
LIFE PARTNER, DRIVING SELF. C-PAP AT NIGHT ONLY.
 
1 WEEK AGO: FATIGUED QUICKLY, WEAKNESS, DIFFICULTY AMBULATING, UNSTEADY GAIT,
HAVING FOOD DELIVERED. CHOKING ON SOLID/SOFT FOODS. N/V. C-PAP INTERMITTENT
USE T/O DAY AND AT NIGHT.
 
CURRENTLY: 1 ASSIST WITH WALKER. NOT TOLERATING C-PAP. PPP X 4, WEAK PEDAL
PULSES. CHRONIC VENOSTASIS BLE. NOT TOLERATING LAYING IN HOSPITAL BED.
TRIPODING SITTING ON THE EDGE OF BED.
 
PHONE CALL PLACED TO PROVIDER TO OBTAIN UPDATED COMFORT CARE ORDERS. VERBAL
ORDERS OBTAINED AND PLACED ACCORDINGLY.
 
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
 
PC TO REMAIN AVAILABLE AS NEEDED.
PRIMARY RN NOTIFIED OF UPDATED ORDERS.
CM NOTIFIED OF REQUEST FOR [facility] HOSPICE PLACEMENT.

## 2024-12-10 NOTE — NUR
SPOKE WITH PT'S NEPHEW BY PHONE THIS MORNING AT 1005. HE REQUESTS PT'S CARE BE
TRANSFERRED TO [facility] IN [place].  HOME INFORMATION
RELAYED TO CHARGE RN.
 
MELINA ASKED FOR PT'S BELONGINGS BE SEND WITH PT UPON TRANSFER.
 
CHADROSINA AMBRIZ'S CELL PHONE [phone], [facility] HOME PHONE [phone].

## 2024-12-10 NOTE — NUR
TIME OF DEATH
 
TIME OF DEATH 0510. DEATH CONFIRMED BY CEDRIC SHELTON RN. DR. AKHTAR
NOTIFIED OF DEATH AT 0515. PATIENTS NEXT OF KIN ROSINA CALLED VIA TELEPHONE
TWICE WITH NO RESPONSE AND NO VOICEMAIL SETUP. CHARGE NURSE KAILA NOTIFIED THAT
FAMILY HAS NOT BEEN NOTIFIED YET OF PATIENTS DEATH. SIGN PLACED ON DOOR FOR
FAMILY TO SPEAK WITH STAFF BEFORE ENTERING ROOM.

## 2024-12-10 NOTE — NUR
NIGHT SHIFT SUMMARY
 
AT START OF SHIFT PT WAS WIDE AWAKE, ORIENTED X 4, ASKING QUESTIONS ABOUT
TRANFERING TO A FACILITY FOR HOSPICE. SAYS HE WORRIES ABOUT DISAPPOINTING HIS
NEPHEW BUT DOESNT WANT A FEEDING TUBE AND IS TIRED OF FEELING LIKE HE CANNOT
BREATHE. HE ASKED A LOT OF QUESTIONS ABOUT THE DEATH AND DYING PROCESS AND
STATES HE "DOESNT WANT TO SUFFER". ALL QUESTIONS ANSWERED TO PT SATISFACTION.
WE TALKED ABOUT DIFFERENT MEASURES/TREATMENTS/MEDICATIONS THAT WE CAN USE TO
HELP KEEP HIM COMFORTABLE. WE DISCUSSED THE USE OF ROXANOL TO HELP WITH HIS
AIR HUNGER AS HIS RESPIRATORY RATE AT 2000 WAS ABOUT 30-32 AND HE WAS
"TRIPODING" AND GASPING FOR AIR WITH ACCESSORY MUSCLE USE. HE SAYS HE WOULD
LIKE TO BE MEDICATED FOR HIS BREATHING AND WANTS TO FEEL MORE RELAXED. WE
DISCUSSED HOW SOMETIMES THESE MEDICATIONS CAN MAKE PATIENTS FEEL VERY SLEEPY
AND MUCH LESS INTERACTIVE AND HE SAID WAS HAPPY WITH THIS. HE WAS VERY ADAMENT
AND CLEAR THAT HE IS "READY TO GO" BUT THAT IT HAS BEEN HARD TO COMMUNICATE
THIS WITH HIS NEPHEW BECAUSE HIS NEPHEW ALWAYS WANTS HIM TO "FIGHT". WE
REASSURED HIM THAT HIS WISHES WILL BE HONORED AND THAT WE WILL KEEP HIM
COMFORTABLE PER HIS REQUEST. WE HAVE BEEN MEDICATING HIM WITH ROXANOL AND
ATIVAN TO KEEP HIS RESPIRATORY RATE 12-20, WHICH IS THE TYPICAL STANDARD OF
CARE FOR COMFORT CARE PATIENTS. THROUGHOUT THE SHIFT, PT HAS BECOME MORE
HYPOXIC AND GREY IN APPEARANCE, SLIGHTLY CONFUSED, BUT APPEARS MUCH MORE
COMFORTABLE. HE HAS STARTED TO FORGET WHERE HE IS AND WHAT IS GOING ON AND HAS
HAD TO BE REMINDED THAT HE CHOSE TO GO ON HOSPICE. WHEN REMINDED, HE NODS HIS
HEAD "YES" AND THEN FALLS BACK ASLEEP.